# Patient Record
Sex: MALE | Race: WHITE | Employment: OTHER | URBAN - METROPOLITAN AREA
[De-identification: names, ages, dates, MRNs, and addresses within clinical notes are randomized per-mention and may not be internally consistent; named-entity substitution may affect disease eponyms.]

---

## 2017-05-25 LAB
CHOLESTEROL, TOTAL: 192 MG/DL
CHOLESTEROL/HDL RATIO: 3.9
HDLC SERPL-MCNC: 49 MG/DL (ref 35–70)
LDL CHOLESTEROL CALCULATED: 121 MG/DL (ref 0–160)
TRIGL SERPL-MCNC: 111 MG/DL
VLDLC SERPL CALC-MCNC: 22 MG/DL

## 2017-12-11 ENCOUNTER — OFFICE VISIT (OUTPATIENT)
Dept: FAMILY MEDICINE CLINIC | Age: 62
End: 2017-12-11
Payer: COMMERCIAL

## 2017-12-11 VITALS
BODY MASS INDEX: 26.25 KG/M2 | WEIGHT: 173.2 LBS | RESPIRATION RATE: 16 BRPM | SYSTOLIC BLOOD PRESSURE: 125 MMHG | DIASTOLIC BLOOD PRESSURE: 81 MMHG | HEIGHT: 68 IN | HEART RATE: 72 BPM

## 2017-12-11 DIAGNOSIS — Z12.5 PROSTATE CANCER SCREENING: ICD-10-CM

## 2017-12-11 DIAGNOSIS — E78.5 HYPERLIPIDEMIA, UNSPECIFIED HYPERLIPIDEMIA TYPE: Primary | Chronic | ICD-10-CM

## 2017-12-11 DIAGNOSIS — M19.90 OSTEOARTHRITIS, UNSPECIFIED OSTEOARTHRITIS TYPE, UNSPECIFIED SITE: Chronic | ICD-10-CM

## 2017-12-11 DIAGNOSIS — H40.9 GLAUCOMA, UNSPECIFIED GLAUCOMA TYPE, UNSPECIFIED LATERALITY: ICD-10-CM

## 2017-12-11 DIAGNOSIS — Z87.442 HISTORY OF KIDNEY STONES: ICD-10-CM

## 2017-12-11 PROCEDURE — G8419 CALC BMI OUT NRM PARAM NOF/U: HCPCS | Performed by: FAMILY MEDICINE

## 2017-12-11 PROCEDURE — 99214 OFFICE O/P EST MOD 30 MIN: CPT | Performed by: FAMILY MEDICINE

## 2017-12-11 PROCEDURE — G8427 DOCREV CUR MEDS BY ELIG CLIN: HCPCS | Performed by: FAMILY MEDICINE

## 2017-12-11 PROCEDURE — 3017F COLORECTAL CA SCREEN DOC REV: CPT | Performed by: FAMILY MEDICINE

## 2017-12-11 PROCEDURE — 1036F TOBACCO NON-USER: CPT | Performed by: FAMILY MEDICINE

## 2017-12-11 PROCEDURE — G8484 FLU IMMUNIZE NO ADMIN: HCPCS | Performed by: FAMILY MEDICINE

## 2017-12-11 RX ORDER — LATANOPROST 50 UG/ML
1 SOLUTION/ DROPS OPHTHALMIC NIGHTLY
COMMUNITY
End: 2019-01-21

## 2017-12-11 RX ORDER — ATORVASTATIN CALCIUM 20 MG/1
20 TABLET, FILM COATED ORAL DAILY
Qty: 30 TABLET | Refills: 5 | Status: SHIPPED | OUTPATIENT
Start: 2017-12-11 | End: 2018-09-24 | Stop reason: SINTOL

## 2017-12-11 ASSESSMENT — PATIENT HEALTH QUESTIONNAIRE - PHQ9
2. FEELING DOWN, DEPRESSED OR HOPELESS: 0
1. LITTLE INTEREST OR PLEASURE IN DOING THINGS: 0
SUM OF ALL RESPONSES TO PHQ QUESTIONS 1-9: 0
SUM OF ALL RESPONSES TO PHQ9 QUESTIONS 1 & 2: 0

## 2017-12-11 NOTE — PATIENT INSTRUCTIONS
or \"mini-stroke\");  · a thyroid disorder; or  · if you drink more than 2 alcoholic beverages daily. Atorvastatin can cause a condition that results in the breakdown of skeletal muscle tissue, leading to kidney failure. This condition may be more likely to occur in older adults and in people who have kidney disease or poorly controlled hypothyroidism (underactive thyroid). This medicine can harm an unborn baby or cause birth defects. Do not use if you are pregnant. Stop taking atorvastatin and tell your doctor right away if you become pregnant Use effective birth control to prevent pregnancy while you are taking this medicine. Atorvastatin may pass into breast milk and could harm a nursing baby. Do not  breast-feed while you are taking atorvastatin. Atorvastatin is not approved for use by anyone younger than 8years old. How should I take atorvastatin? Follow all directions on your prescription label. Your doctor may occasionally change your dose to make sure you get the best results. Do not use this medicine in larger or smaller amounts or for longer than recommended. Atorvastatin is usually taken once a day, with or without food. Take the medicine at the same time each day. Do not break an atorvastatin tablet before taking it. You may need to stop using atorvastatin for a short time if you have:  · uncontrolled seizures;  · an electrolyte imbalance (such as high or low potassium levels in your blood);  · severely low blood pressure;  · a severe infection or illness; or  · surgery or a medical emergency. While using atorvastatin, you may need frequent blood tests. Atorvastatin is only part of a complete treatment program that may also include diet, exercise, and weight control. Follow your doctor's instructions very closely. Store at room temperature away from moisture, heat, and light. What happens if I miss a dose? Take the missed dose as soon as you remember.  Skip the missed dose if your next dose is less than 12 hours away. Do not  take extra medicine to make up the missed dose. What happens if I overdose? Seek emergency medical attention or call the Poison Help line at 1-550.147.8761. What should I avoid while taking atorvastatin? Avoid eating foods that are high in fat or cholesterol. Atorvastatin will not be as effective in lowering your cholesterol if you do not follow a cholesterol-lowering diet plan. Avoid drinking alcohol. It can raise triglyceride levels and may increase your risk of liver damage. Grapefruit and grapefruit juice may interact with atorvastatin and lead to potentially dangerous effects. Discuss the use of grapefruit products with your doctor. What are the possible side effects of atorvastatin? Get emergency medical help if you have signs of an allergic reaction: hives; difficulty breathing; swelling of your face, lips, tongue, or throat. In rare cases, atorvastatin can cause a condition that results in the breakdown of skeletal muscle tissue, leading to kidney failure. Call your doctor right away if you have unexplained muscle pain, tenderness, or weakness especially if you also have fever, unusual tiredness, and dark colored urine. Also call your doctor at once if you have:  · kidney problems --little or no urinating, painful or difficult urination, swelling in your feet or ankles, feeling tired or short of breath;  · liver problems --nausea, upper stomach pain, itching, tired feeling, loss of appetite, dark urine, hilda-colored stools, jaundice (yellowing of the skin or eyes); or  · signs of a stroke --sudden numbness or weakness (especially on one side of the body), sudden severe headache, slurred speech, problems with vision or balance. Common side effects may include:  · muscle or joint pain;  · diarrhea; or  · upset stomach. This is not a complete list of side effects and others may occur. Call your doctor for medical advice about side effects.  You may report side to indicate that the drug or drug combination is safe, effective or appropriate for any given patient. [facility] does not assume any responsibility for any aspect of healthcare administered with the aid of information [facility] provides. The information contained herein is not intended to cover all possible uses, directions, precautions, warnings, drug interactions, allergic reactions, or adverse effects. If you have questions about the drugs you are taking, check with your doctor, nurse or pharmacist.  Copyright 4099-5906 [address]. Version: 18.03. Revision date: 6/29/2015. Care instructions adapted under license by [facility] ([facility]). If you have questions about a medical condition or this instruction, always ask your healthcare professional. [place] [address] any warranty or liability for your use of this information.

## 2017-12-11 NOTE — PROGRESS NOTES
New patient, here to establish. Previous physician retired, Dr. Betina Morales. Patient utd with colonoscopy, due in 2018 for next one. Flu vaccine in September, pt reported.    States his last tetanus was last year (2016)

## 2017-12-11 NOTE — PROGRESS NOTES
CHIEF COMPLAINT  Chief Complaint   Patient presents with   [facility] Establish Care     New patient, here to establish care. Previous Physician, Dr. Amadou Cardenas is a 57 y.o. male who presents to establish care in our office. He has glaucoma. He is under the care of ophthalmology. He uses eyedrops daily. He denies any recent changes in his vision. He has a history of high cholesterol. He is currently on, liver oil. He also takes a daily aspirin. He has a history of arthritis. He is on no medication for this this time. He indicates that he is up-to-date with colorectal cancer screening. He is due for a colonoscopy in 2018. Furthermore, he indicates that he received his flu shot this past September. ROS  All other review of systems negative, except for those noted. PAST MEDICAL HISTORY    Past Medical History:   Diagnosis Date    Asthma due to seasonal allergies     Eye pressure     Glaucoma     History of kidney stones        SURGICAL HISTORY    Past Surgical History:   Procedure Laterality Date    ANKLE SURGERY      COLONOSCOPY  12/13; due 2018    FINGER SURGERY      TONSILLECTOMY         FAMILY HISTORY    History reviewed. No pertinent family history. SOCIAL HISTORY    Social History     Social History    Marital status:      Spouse name: N/A    Number of children: N/A    Years of education: N/A     Social History Main Topics    Smoking status: Never Smoker    Smokeless tobacco: Never Used    Alcohol use 1.8 oz/week     3 Glasses of wine per week      Comment: drinks red merlot 2-3 times per week.      Drug use: No    Sexual activity: Not Asked     Other Topics Concern    None     Social History Narrative    None       MEDICATIONS  Current Outpatient Prescriptions   Medication Sig Dispense Refill    latanoprost (XALATAN) 0.005 % ophthalmic solution 1 drop nightly      aspirin 81 MG tablet Take 81 mg by mouth daily      COD LIVER OIL PO Osteoarthritis, unspecified osteoarthritis type, unspecified site     4. History of kidney stones  [address] Tian MD   5. Prostate cancer screening  PSA Screening    [address] Racquel Tian MD       ASSESSMENT & PLAN  3  58-year-old man, with the above noted issues, who appears relatively healthy. 2.  I have recommended treatment of his high cholesterol with Lipitor. 3.  Continue current treatment for glaucoma. 4.  With respect to his history of kidney stones, and need for prostate cancer screening, I have referred him to Dr. iLv Reza. 5.  Further evaluation and management will be dependent upon test results. Otherwise, follow up in our office in 6 months. DISCHARGE MEDS  Outpatient Encounter Prescriptions as of 12/11/2017   Medication Sig Dispense Refill    latanoprost (XALATAN) 0.005 % ophthalmic solution 1 drop nightly      aspirin 81 MG tablet Take 81 mg by mouth daily      COD LIVER OIL PO Take 3,000 Units by mouth 2 times daily. No facility-administered encounter medications on file as of 12/11/2017.

## 2018-01-02 ENCOUNTER — TELEPHONE (OUTPATIENT)
Dept: FAMILY MEDICINE CLINIC | Age: 63
End: 2018-01-02

## 2018-01-02 NOTE — TELEPHONE ENCOUNTER
Patient called stating he was prescribed Lipitor 20 mg tab to take once daily. Patient states he has been taking this in the evening per recommendations from our office. He states he was taking this dose for about 10 days and noticed side effects, including restlessness and tingling of arms. He states he decided to cut tablet in half and has been taking Lipitor 10 mg once every evening for the last 5 days and the side effects are absent. He states he plans on getting some lab work done mid January, but hopes it is okay to continue this dose in the meantime. Patient also wondering if PCP can advise on any OTC swimmer ear products. Please advise, thank you!

## 2018-01-18 ENCOUNTER — HOSPITAL ENCOUNTER (OUTPATIENT)
Age: 63
Discharge: HOME | End: 2018-01-18
Payer: COMMERCIAL

## 2018-01-18 DIAGNOSIS — E78.5 HYPERLIPIDEMIA, UNSPECIFIED HYPERLIPIDEMIA TYPE: Chronic | ICD-10-CM

## 2018-01-18 DIAGNOSIS — Z12.5 PROSTATE CANCER SCREENING: ICD-10-CM

## 2018-01-18 LAB
ALBUMIN SERPL-MCNC: 4.7 G/DL (ref 3.5–5.2)
ALBUMIN/GLOBULIN RATIO: 1.8 (ref 1–2.5)
ALP BLD-CCNC: 59 U/L (ref 40–129)
ALT SERPL-CCNC: 21 U/L (ref 5–41)
ANION GAP SERPL CALCULATED.3IONS-SCNC: 14 MMOL/L (ref 9–17)
AST SERPL-CCNC: 21 U/L
BILIRUB SERPL-MCNC: 0.82 MG/DL (ref 0.3–1.2)
BUN BLDV-MCNC: 15 MG/DL (ref 8–23)
BUN/CREAT BLD: NORMAL (ref 9–20)
CALCIUM SERPL-MCNC: 8.9 MG/DL (ref 8.6–10.4)
CHLORIDE BLD-SCNC: 103 MMOL/L (ref 98–107)
CHOLESTEROL/HDL RATIO: 2.2
CHOLESTEROL: 151 MG/DL
CO2: 26 MMOL/L (ref 20–31)
CREAT SERPL-MCNC: 0.92 MG/DL (ref 0.7–1.2)
GFR AFRICAN AMERICAN: >60 ML/MIN
GFR NON-AFRICAN AMERICAN: >60 ML/MIN
GFR SERPL CREATININE-BSD FRML MDRD: NORMAL ML/MIN/{1.73_M2}
GFR SERPL CREATININE-BSD FRML MDRD: NORMAL ML/MIN/{1.73_M2}
GLUCOSE BLD-MCNC: 93 MG/DL (ref 70–99)
HDLC SERPL-MCNC: 70 MG/DL
LDL CHOLESTEROL: 72 MG/DL (ref 0–130)
POTASSIUM SERPL-SCNC: 4.1 MMOL/L (ref 3.7–5.3)
PROSTATE SPECIFIC ANTIGEN: 0.72 UG/L
SODIUM BLD-SCNC: 143 MMOL/L (ref 135–144)
TOTAL PROTEIN: 7.3 G/DL (ref 6.4–8.3)
TRIGL SERPL-MCNC: 45 MG/DL
VLDLC SERPL CALC-MCNC: NORMAL MG/DL (ref 1–30)

## 2018-01-18 PROCEDURE — 36415 COLL VENOUS BLD VENIPUNCTURE: CPT

## 2018-01-18 PROCEDURE — 80061 LIPID PANEL: CPT

## 2018-01-18 PROCEDURE — G0103 PSA SCREENING: HCPCS

## 2018-01-18 PROCEDURE — 80053 COMPREHEN METABOLIC PANEL: CPT

## 2018-01-24 ENCOUNTER — HOSPITAL ENCOUNTER (OUTPATIENT)
Age: 63
Discharge: HOME | End: 2018-01-24
Payer: COMMERCIAL

## 2018-01-24 ENCOUNTER — HOSPITAL ENCOUNTER (OUTPATIENT)
Dept: GENERAL RADIOLOGY | Age: 63
Discharge: HOME | End: 2018-01-24
Payer: COMMERCIAL

## 2018-01-24 DIAGNOSIS — N20.0 URIC ACID NEPHROLITHIASIS: ICD-10-CM

## 2018-01-24 PROCEDURE — 74018 RADEX ABDOMEN 1 VIEW: CPT

## 2018-03-27 PROBLEM — L60.0 ONYCHOCRYPTOSIS: Status: ACTIVE | Noted: 2018-03-27

## 2018-05-03 ENCOUNTER — TELEPHONE (OUTPATIENT)
Dept: PRIMARY CARE CLINIC | Age: 63
End: 2018-05-03

## 2018-06-27 ENCOUNTER — OFFICE VISIT (OUTPATIENT)
Dept: PRIMARY CARE CLINIC | Age: 63
End: 2018-06-27
Payer: MEDICARE

## 2018-06-27 VITALS
BODY MASS INDEX: 26.22 KG/M2 | DIASTOLIC BLOOD PRESSURE: 68 MMHG | SYSTOLIC BLOOD PRESSURE: 108 MMHG | HEIGHT: 68 IN | HEART RATE: 53 BPM | WEIGHT: 173 LBS | OXYGEN SATURATION: 98 %

## 2018-06-27 DIAGNOSIS — M25.521 RIGHT ELBOW PAIN: Primary | ICD-10-CM

## 2018-06-27 PROCEDURE — 1123F ACP DISCUSS/DSCN MKR DOCD: CPT | Performed by: NURSE PRACTITIONER

## 2018-06-27 PROCEDURE — 4040F PNEUMOC VAC/ADMIN/RCVD: CPT | Performed by: NURSE PRACTITIONER

## 2018-06-27 PROCEDURE — G8427 DOCREV CUR MEDS BY ELIG CLIN: HCPCS | Performed by: NURSE PRACTITIONER

## 2018-06-27 PROCEDURE — 3017F COLORECTAL CA SCREEN DOC REV: CPT | Performed by: NURSE PRACTITIONER

## 2018-06-27 PROCEDURE — 1036F TOBACCO NON-USER: CPT | Performed by: NURSE PRACTITIONER

## 2018-06-27 PROCEDURE — G8419 CALC BMI OUT NRM PARAM NOF/U: HCPCS | Performed by: NURSE PRACTITIONER

## 2018-06-27 PROCEDURE — 99213 OFFICE O/P EST LOW 20 MIN: CPT | Performed by: NURSE PRACTITIONER

## 2018-06-27 RX ORDER — VITAMIN B COMPLEX
1 CAPSULE ORAL DAILY
COMMUNITY
End: 2019-11-26

## 2018-06-27 ASSESSMENT — ENCOUNTER SYMPTOMS
CHEST TIGHTNESS: 0
SHORTNESS OF BREATH: 0

## 2018-06-28 DIAGNOSIS — M25.521 ELBOW PAIN, RIGHT: Primary | ICD-10-CM

## 2018-06-29 ENCOUNTER — HOSPITAL ENCOUNTER (OUTPATIENT)
Dept: GENERAL RADIOLOGY | Facility: CLINIC | Age: 63
Discharge: HOME | End: 2018-07-01
Payer: MEDICARE

## 2018-06-29 ENCOUNTER — OFFICE VISIT (OUTPATIENT)
Dept: ORTHOPEDIC SURGERY | Age: 63
End: 2018-06-29
Payer: MEDICARE

## 2018-06-29 VITALS
DIASTOLIC BLOOD PRESSURE: 81 MMHG | BODY MASS INDEX: 25.76 KG/M2 | WEIGHT: 170 LBS | SYSTOLIC BLOOD PRESSURE: 135 MMHG | HEIGHT: 68 IN | HEART RATE: 53 BPM

## 2018-06-29 DIAGNOSIS — M25.521 ELBOW PAIN, RIGHT: ICD-10-CM

## 2018-06-29 DIAGNOSIS — M77.11 RIGHT LATERAL EPICONDYLITIS: Primary | ICD-10-CM

## 2018-06-29 PROCEDURE — 73080 X-RAY EXAM OF ELBOW: CPT

## 2018-06-29 PROCEDURE — 99203 OFFICE O/P NEW LOW 30 MIN: CPT | Performed by: FAMILY MEDICINE

## 2018-07-02 ENCOUNTER — HOSPITAL ENCOUNTER (OUTPATIENT)
Dept: PHYSICAL THERAPY | Facility: CLINIC | Age: 63
Setting detail: THERAPIES SERIES
Discharge: HOME | End: 2018-07-02
Payer: MEDICARE

## 2018-07-02 PROCEDURE — G8984 CARRY CURRENT STATUS: HCPCS

## 2018-07-02 PROCEDURE — 97161 PT EVAL LOW COMPLEX 20 MIN: CPT

## 2018-07-02 PROCEDURE — G8985 CARRY GOAL STATUS: HCPCS

## 2018-07-02 PROCEDURE — 97140 MANUAL THERAPY 1/> REGIONS: CPT

## 2018-07-02 NOTE — CONSULTS
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    Pain:  [] Yes  [] No Location: R forearm Pain Rating: (0-10 scale) 2-3/10 (rest); 6/10 (aggravated)- nagging/weakness  3-4/10, dull shooting, aching, numb  Pain altered Tx:  [] Yes  [] No  Action:  Symptoms:  [x] Improving- laid off the tennis and stretching; sleeping with a wrist splint that is keeping him neutral [] Worsening [] Same  Better:  [] AM    [] PM    [] Sit    [] Rise/Sit    []Stand    [] Walk    [] Lying    [x] Other: rest, medication, wrist splint  Worse: [] AM    [] PM    [] Sit    [] Rise/Sit    []Stand    [] Walk    [] Lying    [] Bend                             [] Valsalva    [x] Other: lifting, gripping  Sleep: [x] OK    [] Disturbed    Goals: reduce pain/achiness; resume playing tennis    Objective:  OBSERVATION No Deficit Deficit Not Tested Comments   Forward Head [] [x] []    Rounded Shoulders [] [x] []    Scap Height/Position [x] [] []    Winging [x] [] []    SH Rhythm [] [x] [] Mild alteration in scapular mechanics   INSPECTION/PALPATION    LUE: TTP over proximal extensor muscle belly    RUE: TTP proximal extensor muscle belly (supinator muscle belly), lateral epicondyle, mild tenderness along distal biceps muscle belly    Tenderness in B pecs with notable tightness- especially pec minor  Tenderness over R UT   NEUROLOGICAL       Cervical ROM/Quadrant [] [] [x]    Reflexes [] [] [x]    Compression/Distraction [] [] [x]    Sensation [x] [] []           ROM  °A/P END FEEL STRENGTH TESTS (+/-) Left Right    Left Right  Left Right Mills Test  pos   Sit Shld Flex  [facility]         [facility]         Sit Shld IR  WFL WFL         Sit Shlf ER WFL WFL         Rhomboids    5 5      Lats    5 5      Mid Trap    5 4+      Lower Trap    4+ 4+      Elbow Flex. (0-150) Full Full  5 5      Elbow Ext. (5/10-0) Full Full  5 4+      Pronation (0-90) 90° 95°  5 5 (no increase in 6/10 with aggravated activities  [x] ? ROM: RUE: pain with passive wrist flexion, decreased active/passive supination  [x] ? Strength: decreased  strength due to pain; decreased parascapular strength  [x] ? Function: UEFS: 69/80, 86.25% max function  [x] Other: Altered scapular mechanics and decreased flexibility of upper trap and pectoralis musculature       Longer term goals: MEET IN 8 VISITS    Pain: Pt will report less than or equal to 0-1/10 R elbow pain at rest and less than or equal to 2-3/10 R elbow pain with repetitive gripping, lifting a cup of coffee, typing, swinging, and putting activities in order to progress to prior level of activity. Ongoing   ROM: Pt will demonstrate improvement in R supination AROM to greater than or equal to 90° and R wrist flexion to greater than or equal to 70° with reports of 0/10 increase in R elbow pain in order to improve tolerance to gripping, lifting, and completing ADLs. Ongoing   Strength: Pt will be able to demonstrate improvement in  strength on the RUE by 10% with 0/10 R elbow pain in order to improve tolerance to gripping and lifting objects. Ongoing   Strength: Pt will be able to demonstrate 5/5 B scapular strength to promote stable base and dynamic stability when participating in recreational activities. Ongoing    Outcome Score: Pt will score greater than or equal to 96% on the UEFS in order to demonstrate improved function Ongoing   Demonstrates knowledge of fall prevention Not needed   Home Exercise Program: Pt will demonstrate independence with HEP.  Ongoing                     Patient goals: reduce pain/achiness, resume playing tennis    G-Codes: Initial  Functional Limitation: Carrying, moving, and handling objects  Functional Assessment Used: UEFS: 13.75% impairment  Current Status Modifier: CI  Goal Status Modifier: [facility]                       Rehab Potential:  [x] Good  [] Fair  [] Poor   Suggested Professional Referral:  [] No  [x] Yes: Golf

## 2018-07-05 ENCOUNTER — HOSPITAL ENCOUNTER (OUTPATIENT)
Dept: PHYSICAL THERAPY | Facility: CLINIC | Age: 63
Setting detail: THERAPIES SERIES
Discharge: HOME | End: 2018-07-05
Payer: MEDICARE

## 2018-07-05 PROCEDURE — 97110 THERAPEUTIC EXERCISES: CPT

## 2018-07-05 PROCEDURE — 97140 MANUAL THERAPY 1/> REGIONS: CPT

## 2018-07-05 NOTE — FLOWSHEET NOTE
of the forearm; upper trap stretch; parascapular and rotator cuff strengthening     Lateral glides and poster-anterior glides to radial head concurrent gripping while monitoring pain 6-10x             Treatment Charges: Mins Units   []  Modalities     [x]  Ther Exercise 40 2   [x]  Manual Therapy 10 1   []  Ther Activities     []  Aquatics     []  Vasocompression     []  Other     Total Treatment time 50 3       Assessment: [x] Progressing toward goals. Good tolerance to manual and exercise with 0/10 pain throughout session. Gave frequent cuing and education for proper form and technique with exercises and stretches. [] No change. [] Other:    Longer term goals: MEET IN 8 VISITS     Pain: Pt will report less than or equal to 0-1/10 R elbow pain at rest and less than or equal to 2-3/10 R elbow pain with repetitive gripping, lifting a cup of coffee, typing, swinging, and putting activities in order to progress to prior level of activity. Ongoing   ROM: Pt will demonstrate improvement in R supination AROM to greater than or equal to 90° and R wrist flexion to greater than or equal to 70° with reports of 0/10 increase in R elbow pain in order to improve tolerance to gripping, lifting, and completing ADLs. Ongoing   Strength: Pt will be able to demonstrate improvement in  strength on the RUE by 10% with 0/10 R elbow pain in order to improve tolerance to gripping and lifting objects. Ongoing   Strength: Pt will be able to demonstrate 5/5 B scapular strength to promote stable base and dynamic stability when participating in recreational activities. Ongoing    Outcome Score: Pt will score greater than or equal to 96% on the UEFS in order to demonstrate improved function Ongoing   Demonstrates knowledge of fall prevention Not needed   Home Exercise Program: Pt will demonstrate independence with HEP.  Ongoing                      Patient goals: reduce pain/achiness, resume playing tennis     G-Codes:

## 2018-07-05 NOTE — PLAN OF CARE
[] Anthony Clark        Outpatient Physical                Therapy       [address]       Phone: [phone]       Fax: [phone] [] [facility]       Promotion at [address] [facility]       Phone: [phone]       Fax: [phone] [] Ian Ward [facility]      for Health Promotion    [address]     Phone: [phone]     Fax:  [phone]     Physical Therapy Plan of Care    Date:  2018  Patient: Lucien Herring  : 1955  MRN: [id number]  Physician: Dr. Aracelis Abel: Agatha Callahan Diagnosis: M77.11 (ICD-10-CM) - Right lateral epicondylitis                    Rehab Codes: M25.521, M79.1, R29.3, M79.631, M62.531, M62.541  Onset Date: 2018                     Next 's appt: as needed     Subjective: \"Bill\" or \"Compa\"  CC: R elbow pain  HPI: (onset date): Pt is a 68-year-old male with c/o R lateral elbow pain that has been present for about 10 days. Pt reports he is an avid  and plays 3 days per week for about 2-2.5 hours and golfs about 2-3x/week. Pt notes about 10 days ago, he started to feel some discomfort in the lateral aspect of his R elbow. Pt notes it is more painful when completing a backhand swing and putting when golfing. Pt describes the pain as an achiness/throbbing but does not report any sharp pains. Pt notes the pain is on the dorsal aspect of the R arm and ore lateral vs medial. Pt denies any current numbness and tingling, however, does state sometimes it is on the verge. Pt notes the he does have a hx of neck and R shoulder pain that caused numbness/tingling down the RUE, however, pt reports this current pain seems specific to the elbow. Pt notes he has increased pain with playing tennis, golfing, lifting up a coffee cup. Pt notes when he attempts to lift a coffee cup, he feels weak in the RUE.  Pt reports he also has a part time job and uses the to 2-3/10 R elbow pain with repetitive gripping, lifting a cup of coffee, typing, swinging, and putting activities in order to progress to prior level of activity. Ongoing   ROM: Pt will demonstrate improvement in R supination AROM to greater than or equal to 90° and R wrist flexion to greater than or equal to 70° with reports of 0/10 increase in R elbow pain in order to improve tolerance to gripping, lifting, and completing ADLs. Ongoing   Strength: Pt will be able to demonstrate improvement in  strength on the RUE by 10% with 0/10 R elbow pain in order to improve tolerance to gripping and lifting objects. Ongoing   Strength: Pt will be able to demonstrate 5/5 B scapular strength to promote stable base and dynamic stability when participating in recreational activities. Ongoing    Outcome Score: Pt will score greater than or equal to 96% on the UEFS in order to demonstrate improved function Ongoing   Demonstrates knowledge of fall prevention Not needed   Home Exercise Program: Pt will demonstrate independence with HEP.  Ongoing                      Patient goals: reduce pain/achiness, resume playing tennis                   Treatment Plan:  [x] Therapeutic Exercise             [] Modalities:  [] Therapeutic Activity               [] Ultrasound                  [] Electrical Stimulation  [] Gait Training                          [] Massage        [] Lumbar/Cervical Traction  [x] Neuromuscular Re-education            [x] Cold/hotpack  [] Iontophoresis: 4 mg/mL  [x] Instruction in HEP                                                                       Dexamethasone Sodium  [x] Manual Therapy                                                                          Phosphate 40-80 mAmin  [] Aquatic Therapy                    [x] Vasocompression/                   [] Other:                                                                   Game Ready            []  Medication allergies reviewed for use of Dexamethasone Sodium Phosphate 4mg/ml                with iontophoresis treatments. Pt is not allergic. Frequency:  2 x/week for 8 visits      Electronically signed by: Kindra Adams PT        Physician Signature:________________________________Date:__________________  By signing above or cosigning this note, I have reviewed this plan of care and certify a need for medically necessary rehabilitation services.      *PLEASE SIGN ABOVE AND FAX BACK ALL PAGES*

## 2018-07-09 ENCOUNTER — HOSPITAL ENCOUNTER (OUTPATIENT)
Dept: PHYSICAL THERAPY | Facility: CLINIC | Age: 63
Setting detail: THERAPIES SERIES
Discharge: HOME | End: 2018-07-09
Payer: MEDICARE

## 2018-07-09 PROCEDURE — 97140 MANUAL THERAPY 1/> REGIONS: CPT

## 2018-07-09 PROCEDURE — 97110 THERAPEUTIC EXERCISES: CPT

## 2018-07-09 NOTE — FLOWSHEET NOTE
[] Jacy Dewey       Outpatient Physical        Therapy       [address]       Phone: [phone]       Fax: [phone] [x] [facility] Promotion at [address] [facility]       Phone: [phone]       Fax: [phone] [] [facility] Promotion  [address] [facility]   Phone: [phone]   Fax:  [phone]     Physical Therapy Daily Treatment Note    Date:  2018  Patient Name:  Declan Moore    :  1955  MRN: [id number]  Physician: Dr. Gomez Rodrigo: Anjali Arroyo Diagnosis: M77.11 (ICD-10-CM) - Right lateral epicondylitis                       Rehab Codes: M25.521, M79.1, R29.3, M79.631, M62.531, M62.541  Onset Date: 2018                     Next 's appt: as needed  Visit# / total visits: 3/8  Cancels/No Shows: 0    Subjective:    Pain:  [x] Yes  [] No  Location: R elbow  Pain Rating: (0-10 scale) 0/10  Pain altered Tx:  [x] No  [] Yes  Action:     Comments: pt reports no changes. Still becoming irritated with swing the tennis racket or golf club. Pt denies pain upon entering and comments that his biggest issue seems to be his strength. Pt states he was unable to lift a jug of iced tea.      Objective:  Modalities:   Hawkgrips to extensor aspect of the R forearm and lateral epicondyle (boomerrang) 10'  Ice massage x5 min   Precautions:  Exercises:  Exercise Reps/ Time Weight/ Level Comments   Wrist flexor stretch 3x30\"       Supination stretch 5x10\"       passive extension--> eccentric lowering 10x  3\" hold     pec stretch- doorway  3x 30\"     Upper trap stretch 3x 30\"           Prone I, T, row, extension, scaption 6x ea 6\"                      Other:     Specific Instructions for next treatment: monitor symptoms of exercises- if pain with eccentric lowering- revert back to pain-free isometrics with (30-60\" holds) - wrist in 20-30° extension; manual work to upper trap, pecs, extensor aspect of the forearm; upper trap stretch; parascapular and rotator cuff strengthening     Lateral glides and poster-anterior glides to radial head concurrent gripping while monitoring pain 6-10x             Treatment Charges: Mins Units   []  Modalities     [x]  Ther Exercise 40 2   [x]  Manual Therapy 10 1   []  Ther Activities     []  Aquatics     []  Vasocompression     []  Other     Total Treatment time 50 3       Assessment: [x] Progressing toward goals. Fair tolerance to exercises, pt reports feeling some soreness during lateral glides and tenderness with hawk . Pt does not report increase pain after PT, but requests ice massage stating it helps to \"calm down\" his symptoms. [] No change. [] Other:    Longer term goals: MEET IN 8 VISITS     Pain: Pt will report less than or equal to 0-1/10 R elbow pain at rest and less than or equal to 2-3/10 R elbow pain with repetitive gripping, lifting a cup of coffee, typing, swinging, and putting activities in order to progress to prior level of activity. Ongoing   ROM: Pt will demonstrate improvement in R supination AROM to greater than or equal to 90° and R wrist flexion to greater than or equal to 70° with reports of 0/10 increase in R elbow pain in order to improve tolerance to gripping, lifting, and completing ADLs. Ongoing   Strength: Pt will be able to demonstrate improvement in  strength on the RUE by 10% with 0/10 R elbow pain in order to improve tolerance to gripping and lifting objects. Ongoing   Strength: Pt will be able to demonstrate 5/5 B scapular strength to promote stable base and dynamic stability when participating in recreational activities. Ongoing    Outcome Score: Pt will score greater than or equal to 96% on the UEFS in order to demonstrate improved function Ongoing   Demonstrates knowledge of fall prevention Not needed   Home Exercise Program: Pt will demonstrate independence with HEP.  Ongoing

## 2018-07-12 ENCOUNTER — APPOINTMENT (OUTPATIENT)
Dept: PHYSICAL THERAPY | Facility: CLINIC | Age: 63
End: 2018-07-12
Payer: MEDICARE

## 2018-07-13 ENCOUNTER — HOSPITAL ENCOUNTER (OUTPATIENT)
Dept: PHYSICAL THERAPY | Facility: CLINIC | Age: 63
Setting detail: THERAPIES SERIES
Discharge: HOME | End: 2018-07-13
Payer: MEDICARE

## 2018-07-13 PROCEDURE — 97110 THERAPEUTIC EXERCISES: CPT

## 2018-07-13 PROCEDURE — 97140 MANUAL THERAPY 1/> REGIONS: CPT

## 2018-07-16 ENCOUNTER — HOSPITAL ENCOUNTER (OUTPATIENT)
Dept: PHYSICAL THERAPY | Facility: CLINIC | Age: 63
Setting detail: THERAPIES SERIES
Discharge: HOME | End: 2018-07-16
Payer: MEDICARE

## 2018-07-16 PROCEDURE — 97110 THERAPEUTIC EXERCISES: CPT

## 2018-07-16 PROCEDURE — 97140 MANUAL THERAPY 1/> REGIONS: CPT

## 2018-07-16 NOTE — FLOWSHEET NOTE
ROM: Pt will demonstrate improvement in R supination AROM to greater than or equal to 90° and R wrist flexion to greater than or equal to 70° with reports of 0/10 increase in R elbow pain in order to improve tolerance to gripping, lifting, and completing ADLs. Ongoing   Strength: Pt will be able to demonstrate improvement in  strength on the RUE by 10% with 0/10 R elbow pain in order to improve tolerance to gripping and lifting objects. Ongoing   Strength: Pt will be able to demonstrate 5/5 B scapular strength to promote stable base and dynamic stability when participating in recreational activities. Ongoing    Outcome Score: Pt will score greater than or equal to 96% on the UEFS in order to demonstrate improved function Ongoing   Demonstrates knowledge of fall prevention Not needed   Home Exercise Program: Pt will demonstrate independence with HEP. Ongoing                      Patient goals: reduce pain/achiness, resume playing tennis     G-Codes: Initial  Functional Limitation: Carrying, moving, and handling objects  Functional Assessment Used: UEFS: 13.75% impairment  Current Status Modifier: CI  Goal Status Modifier: [address]      Pt. Education:  [] Yes  [x] No  [] Reviewed Prior HEP/Ed  Method of Education: [] Verbal  [] Demo  [] Written  Comprehension of Education:  [] Verbalizes understanding. [] Demonstrates understanding. [] Needs review. [] Demonstrates/verbalizes HEP/Ed previously given. Plan: [x] Continue per plan of care.    [] Other:      Time In: 1:45pm              Time Out: 2:45pm    Electronically signed by:  Jeri Baeza PTA

## 2018-07-18 ENCOUNTER — HOSPITAL ENCOUNTER (OUTPATIENT)
Dept: PHYSICAL THERAPY | Facility: CLINIC | Age: 63
Setting detail: THERAPIES SERIES
Discharge: HOME | End: 2018-07-18
Payer: MEDICARE

## 2018-07-18 PROCEDURE — 97110 THERAPEUTIC EXERCISES: CPT

## 2018-07-18 PROCEDURE — 97140 MANUAL THERAPY 1/> REGIONS: CPT

## 2018-07-18 NOTE — FLOWSHEET NOTE
[] Julisa Hebert       Outpatient Physical        Therapy       [address]       Phone: [phone]       Fax: [phone] [x] [facility] at [address]       Phone: [phone]       Fax: [phone] [] Maurizio. [address]   Phone: [phone]   Fax:  [phone]     Physical Therapy Daily Treatment Note    Date:  2018  Patient Name:  Uriel Chapin    :  1955  MRN: [id number]  Physician: Dr. Simon Grams: Lee Gaspar Diagnosis: M77.11 (ICD-10-CM) - Right lateral epicondylitis                       Rehab Codes: M25.521, M79.1, R29.3, M79.631, M62.531, M62.541  Onset Date: 2018                     Next 's appt: as needed  Visit# / total visits:   Cancels/No Shows: 0    Subjective:    Pain:  [x] Yes  [] No  Location: R elbow  Pain Rating: (0-10 scale) 1-2/10  Pain altered Tx:  [x] No  [] Yes  Action:     Comments: Pt stated he played tennis this morning and reports certain shots increase his pain.    Objective:   Modalities:   Hawkgrips to extensor aspect of the R forearm and lateral epicondyle (boomerrang) 10'  Ice massage x5 min    Precautions:  Exercises:  Exercise Reps/ Time Weight/ Level Comments   Wrist extensor stretch 3x30\"   Wrist into flexion    Supination stretch 5x10\"       Passive extension--> eccentric lowering 10x  3\" hold Seated against gravity with arm on UE riser on 18     Pec stretch- doorway  3x 30\"     Upper trap stretch 3x 30\"           Prone I, T, row, extension, scaption 10x ea 6\" Increased reps 18   tband Bilateral ER 15x  red Added  Verbal cues needed   Strenghtening      Wrist flexion  15x 2# Added on 18 Increased weight    Wrist extension  15x 2# Added on 18 Increased weight    Supination/Pronation   15x 2 weights distally  Added on 18   Towel twists

## 2018-07-23 ENCOUNTER — HOSPITAL ENCOUNTER (OUTPATIENT)
Dept: PHYSICAL THERAPY | Facility: CLINIC | Age: 63
Setting detail: THERAPIES SERIES
Discharge: HOME | End: 2018-07-23
Payer: MEDICARE

## 2018-07-23 PROCEDURE — 97110 THERAPEUTIC EXERCISES: CPT

## 2018-07-23 PROCEDURE — 97140 MANUAL THERAPY 1/> REGIONS: CPT

## 2018-07-23 NOTE — FLOWSHEET NOTE
[] Migdalia Monday       Outpatient Physical        Therapy       [address]       Phone: [phone]       Fax: [phone] [x] [facility] at [address] [facility]       Phone: [phone]       Fax: [phone] [] [facility]  [address] [facility]   Phone: [phone]   Fax:  [phone]     Physical Therapy Daily Treatment Note    Date:  2018  Patient Name:  Anamika Juares    :  1955  MRN: [id number]  Physician: Dr. Holly Kunz: [facility]  Medical Diagnosis: M77.11 (ICD-10-CM) - Right lateral epicondylitis                       Rehab Codes: M25.521, M79.1, R29.3, M79.631, M62.531, M62.541  Onset Date: 2018                     Next 's appt: as needed  Visit# / total visits:   Cancels/No Shows: 0    Subjective:    Pain:  [x] Yes  [] No  Location: R elbow  Pain Rating: (0-10 scale) 1/10  Pain altered Tx:  [x] No  [] Yes  Action:     Comments: Pt reports he continues to play tennis and notes less frequent \"zingers\" when hitting the ball. Pt also notes improvements in overall episodes of weakness. Pt notes he can  coffee cup, iced tea, and hand shake with decreased discomfort.    Objective:   Modalities:   Hawkgrips to extensor aspect of the R forearm and lateral epicondyle (boomerrang) 4'- decreased tenderness noted- decreased following minimal time with manual followed by stretching  Ice massage x5 min    Precautions:  Exercises: bolded completed   Exercise Reps/ Time Weight/ Level Comments   UBE 4' L1 Added    Wrist extensor stretch 3x30\"  2x30\"   Wrist into flexion    Pec stretch- doorway  3x 30\"     Upper trap stretch 2x 30\"           Prone T, row, extension, scaption 10x ea 1# Added weight    tband Bilateral ER 2x10  red Added  Verbal cues needed   D2 PNF flexion 2x10 red Added    D1 extension- simulated backswing 2x10 red Added 7/23   Strenghtening      Wrist flexion  15x 2# Added on 7/16/18 Increased weight 7/18   Wrist extension  15x 2# Added on 7/16/18 Increased weight 7/18    Emphasis on slow lowering   Supination/Pronation   15x 2 weights distally  Added on 7/16/18  Feels some discomfort in the ulnar aspect of the R wrist with pronation   Towel twists  5x10\"  Added on 7/16/18   Twisting motion for 10\"   Hand maze 4x   Added 7/18/18   Other:    7/23/18-  strength  No pain- 67# (66.6#)  65  65  70     Specific Instructions for next treatment: monitor symptoms of exercises- if pain with eccentric lowering- revert back to pain-free isometrics with (30-60\" holds) - wrist in 20-30° extension; manual work to upper trap, pecs, extensor aspect of the forearm; upper trap stretch; parascapular and rotator cuff strengthening     Lateral glides and poster-anterior glides to radial head concurrent gripping while monitoring pain 6-10x-not today             Treatment Charges: Mins Units   []  Modalities     [x]  Ther Exercise 35 2   [x]  Manual Therapy 4 1   []  Ther Activities     []  Aquatics     []  Vasocompression     []  Other     Total Treatment time 39 3   Estimated Medicare Cost (as of 7/23/18): $589.27    Assessment: [x] Progressing toward goals. Pt denies an increase in R forearm pain at the end of the session this date. Pt was able to complete 3 consecutive max  strength activities without reports of pain in the extensor compartment of the R forearm. Pt with continued tenderness along the ECRB/ECRL which is improved with manual therapy. Pt with good tolerance to all isotonic strengthening activities in the R wrist with notable decrease eccentric strength of the R wrist extensors. Pt with ability to progress parascapular strengthening exercises without adverse events. Pt advised to decrease frequency of putting practice to allow for decreased repetitive movement.  Pt with be re-assessed at the next visit and

## 2018-07-26 ENCOUNTER — HOSPITAL ENCOUNTER (OUTPATIENT)
Dept: PHYSICAL THERAPY | Facility: CLINIC | Age: 63
Setting detail: THERAPIES SERIES
Discharge: HOME | End: 2018-07-26
Payer: MEDICARE

## 2018-07-26 PROCEDURE — G8985 CARRY GOAL STATUS: HCPCS

## 2018-07-26 PROCEDURE — 97110 THERAPEUTIC EXERCISES: CPT

## 2018-07-26 PROCEDURE — G8986 CARRY D/C STATUS: HCPCS

## 2018-07-26 NOTE — FLOWSHEET NOTE
Added weight 7/23   tband Bilateral ER 2x10  red Added 7/18 Verbal cues needed   D2 PNF flexion 2x10 red Added 7/23   D1 extension- simulated backswing 2x10 red Added 7/23   Strenghtening      Wrist flexion  15x 2# Added on 7/16/18 Increased weight 7/18   Wrist extension  15x 2# Added on 7/16/18 Increased weight 7/18    Emphasis on slow lowering   Supination/Pronation   15x 2 weights distally  Added on 7/16/18  Feels some discomfort in the ulnar aspect of the R wrist with pronation   Towel twists  5x10\"  Added on 7/16/18   Twisting motion for 10\"   Hand maze 4x   Added 7/18/18   Other:      7/26/18  PRTEE Score 7/5/18 7/26/18   Total Score 22/100 15.5/100    strength: 84# (mild increase in pain 1-2/10)  Parascapular strength:  Rhomboids: 5/5 B  Lats: 5/5, B  Middle trap: 5/5  Lower trap: 5/5    R wrist AROM- pain-free  Flexion: 75°  Extension: 73°  RD, UD, supination, pronation: [facility]        Treatment Charges: Mins Units   []  Modalities     [x]  Ther Exercise 40 3   [x]  Manual Therapy 3 0   []  Ther Activities     []  Aquatics     []  Vasocompression     []  Other     Total Treatment time 39 3   Estimated Medicare Cost (as of 7/26/18): $857.33    Assessment: [x] Progressing toward goals. Pt denies an increase in R forearm pain at the end of the session. pt with improvements in R forearm AROM, strength, and tolerance to gripping activities without an increase in R forearm pain. pt reports he would like to continue addressing his symptoms with the HEP provided. Pt was advised to decrease the frequency of tennis secondary to increasing to 3 times per week resulted in aggravation of symptoms. Pt verbalizes understanding and will be seen for an additional visit for golf assessment. [] No change.      [] Other:    Longer term goals: MEET IN 8 VISITS     Pain: Pt will report less than or equal to 0-1/10 R elbow pain at rest and less than or equal to 2-3/10 R elbow pain with repetitive gripping, lifting a cup of coffee, typing, swinging, and putting activities in order to progress to prior level of activity. MET 7/26/18   ROM: Pt will demonstrate improvement in R supination AROM to greater than or equal to 90° and R wrist flexion to greater than or equal to 70° with reports of 0/10 increase in R elbow pain in order to improve tolerance to gripping, lifting, and completing ADLs. Partially MET 7/26/18   Strength: Pt will be able to demonstrate improvement in  strength on the RUE by 10% with 0/10 R elbow pain in order to improve tolerance to gripping and lifting objects. MET 7/26/18   Strength: Pt will be able to demonstrate 5/5 B scapular strength to promote stable base and dynamic stability when participating in recreational activities. Partially MET 7/26/18   Outcome Score: Pt will score greater than or equal to 96% on the UEFS in order to demonstrate improved function NOT MET 7/26/18   Demonstrates knowledge of fall prevention Not needed   Home Exercise Program: Pt will demonstrate independence with HEP. MET 7/26/18                     Patient goals: reduce pain/achiness, resume playing tennis     G-Codes: Visit 8  Functional Limitation: Carrying, moving, and handling objects  Functional Assessment Used: UEFS: 16.25% impairment  Current Status Modifier: CI  Goal Status Modifier: [address] [place] [address]    G-Codes: Initial  Functional Limitation: Carrying, moving, and handling objects  Functional Assessment Used: UEFS: 13.75% impairment  Current Status Modifier: CI  Goal Status Modifier: [address] [place] [address]      Pt. Education:  [x] Yes  [] No  [] Reviewed Prior HEP/Ed  Method of Education: [x] Verbal  [] Demo  [x] Written  Comprehension of Education:  [x] Verbalizes understanding. [] Demonstrates understanding. [] Needs review. [x] Demonstrates/verbalizes HEP/Ed previously given. Plan: [] Continue per plan of care.    [x] Other: Discharge formal therapy      Time In: 5:40 pm              Time Out: 6:30pm    Electronically signed by:  Maggie Anderson

## 2018-07-30 NOTE — PROGRESS NOTES
increase in R forearm pain at the end of the session. pt with improvements in R forearm AROM, strength, and tolerance to gripping activities without an increase in R forearm pain. pt reports he would like to continue addressing his symptoms with the HEP provided. Pt was advised to decrease the frequency of tennis secondary to increasing to 3 times per week resulted in aggravation of symptoms. Pt verbalizes understanding and will be seen for an additional visit for golf assessment. [] No change. [] Other:     Longer term goals: MEET IN 8 VISITS     Pain: Pt will report less than or equal to 0-1/10 R elbow pain at rest and less than or equal to 2-3/10 R elbow pain with repetitive gripping, lifting a cup of coffee, typing, swinging, and putting activities in order to progress to prior level of activity. MET 7/26/18   ROM: Pt will demonstrate improvement in R supination AROM to greater than or equal to 90° and R wrist flexion to greater than or equal to 70° with reports of 0/10 increase in R elbow pain in order to improve tolerance to gripping, lifting, and completing ADLs.   Partially MET 7/26/18   Strength: Pt will be able to demonstrate improvement in  strength on the RUE by 10% with 0/10 R elbow pain in order to improve tolerance to gripping and lifting objects.  MET 7/26/18   Strength: Pt will be able to demonstrate 5/5 B scapular strength to promote stable base and dynamic stability when participating in recreational activities. Partially MET 7/26/18   Outcome Score: Pt will score greater than or equal to 96% on the UEFS in order to demonstrate improved function NOT MET 7/26/18   Demonstrates knowledge of fall prevention Not needed   Home Exercise Program: Pt will demonstrate independence with HEP.  MET 7/26/18         Patient goals: reduce pain/achiness, resume playing tennis     G-Codes: Visit 8  Functional Limitation: Carrying, moving, and handling objects  Functional Assessment Used: UEFS: 16.25% impairment  Current Status Modifier: CI  Goal Status Modifier: [address]      Treatment to Date:  [x] Therapeutic Exercise    [] Modalities:  [] Therapeutic Activity    [] Ultrasound  [] Electrical Stimulation  [] Gait Training     [] Massage       [] Lumbar/Cervical Traction  [] Neuromuscular Re-education [x] Cold/hotpack [] Iontophoresis: 4 mg/mL  [x] Instruction in Home Exercise Program                     Dexamethasone Sodium  [x] Manual Therapy             Phosphate 40-80 mAmin  [] Aquatic Therapy                   [] Vasocompression/   [] Other:  [] Dry Needling                                           Game Ready        Patient Status:     [x] Continue per initial plan of care. [] Additional visits necessary. [x] Other:Additional visit for Golf assessment     Requested Frequency/Duration: 1-2 times per week for 1-2 treatments        Electronically signed by Sonu Wills PT on 7/30/2018 at 2:24 PM      If you have any questions or concerns, please don't hesitate to call. Thank you for your referral.    Physician Signature:________________________________Date:__________________  By signing above or cosigning this note, I have reviewed this plan of care and certify a need for medically necessary rehabilitation services.      *PLEASE SIGN ABOVE AND FAX BACK ALL PAGES*

## 2018-08-01 ENCOUNTER — HOSPITAL ENCOUNTER (OUTPATIENT)
Dept: PHYSICAL THERAPY | Facility: CLINIC | Age: 63
Setting detail: THERAPIES SERIES
Discharge: HOME | End: 2018-08-01
Payer: MEDICARE

## 2018-08-01 PROCEDURE — G8985 CARRY GOAL STATUS: HCPCS

## 2018-08-01 PROCEDURE — G8986 CARRY D/C STATUS: HCPCS

## 2018-08-01 PROCEDURE — 97110 THERAPEUTIC EXERCISES: CPT

## 2018-08-01 NOTE — FLOWSHEET NOTE
[] [facility]       Outpatient Physical        Therapy       [address]       Phone: [phone]       Fax: [phone] [x] [facility] at [address]       Phone: [phone]       Fax: [phone] [] [facility].  [address] [place] [address] Promotion  [address] [place] [address]   Phone: [phone]   Fax:  [phone]     Physical Therapy Daily Treatment Note    Date:  2018  Patient Name:  Dinah Rushing    :  1955  MRN: [id number]  Physician: Dr. Olya Mars: Kirk Shafer  Medical Diagnosis: M77.11 (ICD-10-CM) - Right lateral epicondylitis                       Rehab Codes: M25.521, M79.1, R29.3, M79.631, M62.531, M62.541  Onset Date: 2018                     Next 's appt: as needed  Visit# / total visits: 9    Cancels/No Shows: 0    Subjective:    Pain:  [x] Yes  [] No  Location: R elbow  Pain Rating: (0-10 scale) 0/10  Pain altered Tx:  [x] No  [] Yes  Action:     Comments: Pt has completed 8 PT visits for R lateral epicondylitis; he returns today for golf specific assessment and for therapist to provide any additional recommendations that would assist in Mr Villalpando's rehab     Objective:    Modalities:     Precautions:  Exercises: Held 18   Exercise Reps/ Time Weight/ Level Comments   UBE 4' L1 Added    Wrist extensor stretch 3x30\"  2x30\"   Wrist into flexion    Pec stretch- doorway  3x 30\"     Upper trap stretch 2x 30\"           Prone T, row, extension, scaption 10x ea 1# Added weight    tband Bilateral ER 2x10  red Added  Verbal cues needed   D2 PNF flexion 2x10 red Added    D1 extension- simulated backswing 2x10 red Added    Strenghtening      Wrist flexion  15x 2# Added on 18 Increased weight    Wrist extension  15x 2# Added on 18 Increased weight     Emphasis on slow lowering   Supination/Pronation   15x 2 weights distally  Added on 7/16/18  Feels some discomfort in the ulnar aspect of the R wrist with pronation   Towel twists  5x10\"  Added on 7/16/18   Twisting motion for 10\"   Hand maze 4x   Added 7/18/18   Other:      7/26/18  PRTEE Score 7/5/18 7/26/18   Total Score 22/100 15.5/100    strength: 84# (mild increase in pain 1-2/10)  Parascapular strength:  Rhomboids: 5/5 B  Lats: 5/5, B  Middle trap: 5/5  Lower trap: 5/5    R wrist AROM- pain-free  Flexion: 75°  Extension: 73°  RD, UD, supination, pronation: [facility]        Treatment Charges: Mins Units   []  Modalities     [x]  Ther Exercise 35 2   []  Manual Therapy     []  Ther Activities     []  Aquatics     []  Vasocompression     []  Other     Total Treatment time 35 2   Estimated Medicare Cost (as of 8/1/18/18): $717.22    Assessment: [] Progressing toward goals. [] No change.      [x] Other:TPI assessment 8/1/18:  Starting pelvic tilt    L Pelvic Rotation  R  L  Torso Rotation R   Overhead deep squat   Neutral* X   Good*    X Good*  X   Bar overhead deep squat   S-posture   X limited X   limited    Arms down full deep squat   C-posture     Holding shoulders     Holding hips   X Arms down limited deep squat   Amount of motion     Improves     X improves X   Half kneeling ankle test   Normal*   X Doesn't improve    Doesn't improve    X Good DF bilaterally   Able to flatten X   coordination           R ankle DF limited   Both limited    Good rotary mvt*           L ankle DF limited   Quality of mvt    X More lateral mvt         Both Limited   Smooth*                 Weight shift   Shake n bake              X No weight shift   Did not test                 Shift to R                     Shift to L                90/90     *See additional test results in soft chart*   L      standing R         Greater than spine angle         X Equal to spine angle X         Less than spine angle           Golf posture           Equal to stand        X Less than stand  X         Greater Reviewed Prior HEP/Ed  Method of Education: [x] Verbal  [] Demo  [x] Written  Comprehension of Education:  [x] Verbalizes understanding. [] Demonstrates understanding. [] Needs review. [x] Demonstrates/verbalizes HEP/Ed previously given. Plan: [] Continue per plan of care.    [x] Other: Discharge formal therapy      Time In: 2:45 pm              Time Out: 3:45 pm    Electronically signed by:  Thais Araiza PT

## 2018-08-02 NOTE — DISCHARGE SUMMARY
increase in R forearm pain at the end of the session. pt with improvements in R forearm AROM, strength, and tolerance to gripping activities without an increase in R forearm pain. pt reports he would like to continue addressing his symptoms with the HEP provided. Pt was advised to decrease the frequency of tennis secondary to increasing to 3 times per week resulted in aggravation of symptoms. Pt verbalizes understanding and will be seen for an additional visit for golf assessment.                          [] No change.                          [] Other:     Longer term goals: MEET IN 8 VISITS     Pain: Pt will report less than or equal to 0-1/10 R elbow pain at rest and less than or equal to 2-3/10 R elbow pain with repetitive gripping, lifting a cup of coffee, typing, swinging, and putting activities in order to progress to prior level of activity. MET 7/26/18   ROM: Pt will demonstrate improvement in R supination AROM to greater than or equal to 90° and R wrist flexion to greater than or equal to 70° with reports of 0/10 increase in R elbow pain in order to improve tolerance to gripping, lifting, and completing ADLs.   Partially MET 7/26/18   Strength: Pt will be able to demonstrate improvement in  strength on the RUE by 10% with 0/10 R elbow pain in order to improve tolerance to gripping and lifting objects.  MET 7/26/18   Strength: Pt will be able to demonstrate 5/5 B scapular strength to promote stable base and dynamic stability when participating in recreational activities. Partially MET 7/26/18   Outcome Score: Pt will score greater than or equal to 96% on the UEFS in order to demonstrate improved function NOT MET 7/26/18   Demonstrates knowledge of fall prevention Not needed   Home Exercise Program: Pt will demonstrate independence with HEP.  MET 7/26/18         Patient goals: reduce pain/achiness, resume playing tennis     G-Codes: Visit 8  Functional Limitation: Carrying, moving, and handling objects  Functional Assessment Used: UEFS: 16.25% impairment  Current Status Modifier: CI  Goal Status Modifier: [address]         Treatment to Date:  [x] Therapeutic Exercise    [] Modalities:  [] Therapeutic Activity    [] Ultrasound  [] Electrical Stimulation  [] Gait Training     [] Massage       [] Lumbar/Cervical Traction  [] Neuromuscular Re-education [x] Cold/hotpack [] Iontophoresis: 4 mg/mL  [x] Instruction in Home Exercise Program                     Dexamethasone Sodium  [x] Manual Therapy             Phosphate 40-80 mAmin  [] Aquatic Therapy                   [] Vasocompression/    [] Other:             Game Ready    Discharge Status:        [x] Pt to continue exercise/home instructions independently. [x] Pt has completed prescribed number of treatment sessions. Electronically signed by Saravanan Caceres PT on 8/2/2018 at 9:06 AM      If you have any questions or concerns, please don't hesitate to call.   Thank you for your referral.

## 2018-09-11 ENCOUNTER — TELEPHONE (OUTPATIENT)
Dept: PRIMARY CARE CLINIC | Age: 63
End: 2018-09-11

## 2018-09-24 ENCOUNTER — OFFICE VISIT (OUTPATIENT)
Dept: PRIMARY CARE CLINIC | Age: 63
End: 2018-09-24
Payer: MEDICARE

## 2018-09-24 VITALS
SYSTOLIC BLOOD PRESSURE: 120 MMHG | WEIGHT: 173.6 LBS | RESPIRATION RATE: 18 BRPM | DIASTOLIC BLOOD PRESSURE: 74 MMHG | HEART RATE: 63 BPM | OXYGEN SATURATION: 97 % | BODY MASS INDEX: 26.4 KG/M2

## 2018-09-24 DIAGNOSIS — H40.053 RAISED INTRAOCULAR PRESSURE OF BOTH EYES: ICD-10-CM

## 2018-09-24 DIAGNOSIS — M79.10 MYALGIA: ICD-10-CM

## 2018-09-24 DIAGNOSIS — E78.5 HYPERLIPIDEMIA, UNSPECIFIED HYPERLIPIDEMIA TYPE: Primary | Chronic | ICD-10-CM

## 2018-09-24 PROCEDURE — 99214 OFFICE O/P EST MOD 30 MIN: CPT | Performed by: FAMILY MEDICINE

## 2018-09-24 ASSESSMENT — PATIENT HEALTH QUESTIONNAIRE - PHQ9
SUM OF ALL RESPONSES TO PHQ9 QUESTIONS 1 & 2: 0
2. FEELING DOWN, DEPRESSED OR HOPELESS: 0
SUM OF ALL RESPONSES TO PHQ QUESTIONS 1-9: 0
SUM OF ALL RESPONSES TO PHQ QUESTIONS 1-9: 0
1. LITTLE INTEREST OR PLEASURE IN DOING THINGS: 0

## 2018-09-24 NOTE — PROGRESS NOTES
Subjective: Marivel Wharton is in for continued evaluation and management. His chronic medical problems include the following; high cholesterol, increased intraocular pressure, and muscle aches.    's high cholesterol. He had been taking atorvastatin for several years without difficulty. He recently began noticing muscle aches, mostly in his legs. He also has muscle aches in other parts of his body. He stopped taking the Lipitor approximately 10 days ago, and has noticed an improvement in his symptoms. He does have increased intraocular pressure. He is under the care of ophthalmology. He takes Xalatan eyedrops. As stated above. He is complaining of muscle aches. They have somewhat improved since discontinuing atorvastatin. Review of systems per HPI, otherwise negative. Allergies; medications; past medical, surgical, family, and social history; and problem list reviewed as indicated in this encounter. Objective:  Vitals: Blood pressure 120/74, pulse 63, resp. rate 18, weight 173 lb 9.6 oz (78.7 kg), SpO2 97 %. Constitutional: He is oriented to person, place, and time. He appears well-developed and well-nourished and in no acute distress. Cardiovascular: Normal rate and regular rhythm, no murmur, rub, or gallop    Pulmonary/Chest: Effort normal and breath sounds normal. No rales or wheezes. No chest retraction. Extremities: no clubbing, cyanosis, or edema  Neurological:  CN II - XII grossly intact; no focal neurological deficits  Psychiatric:  Well groomed, well dressed. The patient maintains appropriate eye contact and does not appear to be responding to internal stimuli. No agitation      Assessment/ Plan / Medical Decision Making   Diagnosis Orders   1. Hyperlipidemia, unspecified hyperlipidemia type  Lipid Panel   2. Raised intraocular pressure of both eyes     3.  Myalgia  CK    CBC    Comprehensive Metabolic Panel    Urinalysis    TSH without Reflex       Medications, laboratory testing,

## 2018-09-25 ENCOUNTER — HOSPITAL ENCOUNTER (OUTPATIENT)
Age: 63
Discharge: HOME | End: 2018-09-25
Payer: MEDICARE

## 2018-09-25 DIAGNOSIS — M79.10 MYALGIA: ICD-10-CM

## 2018-09-25 LAB
-: NORMAL
ALBUMIN SERPL-MCNC: 4.1 G/DL (ref 3.5–5.2)
ALBUMIN/GLOBULIN RATIO: 1.5 (ref 1–2.5)
ALP BLD-CCNC: 60 U/L (ref 40–129)
ALT SERPL-CCNC: 21 U/L (ref 5–41)
AMORPHOUS: NORMAL
ANION GAP SERPL CALCULATED.3IONS-SCNC: 10 MMOL/L (ref 9–17)
AST SERPL-CCNC: 18 U/L
BACTERIA: NORMAL
BILIRUB SERPL-MCNC: 0.28 MG/DL (ref 0.3–1.2)
BILIRUBIN URINE: NEGATIVE
BUN BLDV-MCNC: 13 MG/DL (ref 8–23)
BUN/CREAT BLD: ABNORMAL (ref 9–20)
CALCIUM SERPL-MCNC: 9.3 MG/DL (ref 8.6–10.4)
CASTS UA: NORMAL /LPF (ref 0–8)
CHLORIDE BLD-SCNC: 103 MMOL/L (ref 98–107)
CO2: 27 MMOL/L (ref 20–31)
COLOR: YELLOW
COMMENT UA: ABNORMAL
CREAT SERPL-MCNC: 0.97 MG/DL (ref 0.7–1.2)
CRYSTALS, UA: NORMAL /HPF
EPITHELIAL CELLS UA: NORMAL /HPF (ref 0–5)
GFR AFRICAN AMERICAN: >60 ML/MIN
GFR NON-AFRICAN AMERICAN: >60 ML/MIN
GFR SERPL CREATININE-BSD FRML MDRD: ABNORMAL ML/MIN/{1.73_M2}
GFR SERPL CREATININE-BSD FRML MDRD: ABNORMAL ML/MIN/{1.73_M2}
GLUCOSE BLD-MCNC: 87 MG/DL (ref 70–99)
GLUCOSE URINE: NEGATIVE
HCT VFR BLD CALC: 44.5 % (ref 40.7–50.3)
HEMOGLOBIN: 14.3 G/DL (ref 13–17)
KETONES, URINE: NEGATIVE
LEUKOCYTE ESTERASE, URINE: ABNORMAL
MCH RBC QN AUTO: 34 PG (ref 25.2–33.5)
MCHC RBC AUTO-ENTMCNC: 32.1 G/DL (ref 28.4–34.8)
MCV RBC AUTO: 106 FL (ref 82.6–102.9)
MUCUS: NORMAL
NITRITE, URINE: NEGATIVE
NRBC AUTOMATED: 0 PER 100 WBC
OTHER OBSERVATIONS UA: NORMAL
PDW BLD-RTO: 13.9 % (ref 11.8–14.4)
PH UA: 5 (ref 5–8)
PLATELET # BLD: 196 K/UL (ref 138–453)
PMV BLD AUTO: 10.4 FL (ref 8.1–13.5)
POTASSIUM SERPL-SCNC: 5.2 MMOL/L (ref 3.7–5.3)
PROTEIN UA: NEGATIVE
RBC # BLD: 4.2 M/UL (ref 4.21–5.77)
RBC UA: NORMAL /HPF (ref 0–4)
RENAL EPITHELIAL, UA: NORMAL /HPF
SODIUM BLD-SCNC: 140 MMOL/L (ref 135–144)
SPECIFIC GRAVITY UA: 1.01 (ref 1–1.03)
TOTAL CK: 160 U/L (ref 39–308)
TOTAL PROTEIN: 6.8 G/DL (ref 6.4–8.3)
TRICHOMONAS: NORMAL
TSH SERPL DL<=0.05 MIU/L-ACNC: 2.35 MIU/L (ref 0.3–5)
TURBIDITY: CLEAR
URINE HGB: NEGATIVE
UROBILINOGEN, URINE: NORMAL
WBC # BLD: 4.1 K/UL (ref 3.5–11.3)
WBC UA: NORMAL /HPF (ref 0–5)
YEAST: NORMAL

## 2018-09-25 PROCEDURE — 36415 COLL VENOUS BLD VENIPUNCTURE: CPT

## 2018-09-25 PROCEDURE — 82550 ASSAY OF CK (CPK): CPT

## 2018-09-25 PROCEDURE — 81001 URINALYSIS AUTO W/SCOPE: CPT

## 2018-09-25 PROCEDURE — 80053 COMPREHEN METABOLIC PANEL: CPT

## 2018-09-25 PROCEDURE — 85027 COMPLETE CBC AUTOMATED: CPT

## 2018-09-25 PROCEDURE — 84443 ASSAY THYROID STIM HORMONE: CPT

## 2018-11-07 ENCOUNTER — HOSPITAL ENCOUNTER (OUTPATIENT)
Age: 63
Discharge: HOME | End: 2018-11-07
Payer: MEDICARE

## 2018-11-07 DIAGNOSIS — E78.5 HYPERLIPIDEMIA, UNSPECIFIED HYPERLIPIDEMIA TYPE: Chronic | ICD-10-CM

## 2018-11-07 LAB
CHOLESTEROL/HDL RATIO: 4.4
CHOLESTEROL: 211 MG/DL
HDLC SERPL-MCNC: 48 MG/DL
LDL CHOLESTEROL: 140 MG/DL (ref 0–130)
TRIGL SERPL-MCNC: 114 MG/DL
VLDLC SERPL CALC-MCNC: ABNORMAL MG/DL (ref 1–30)

## 2018-11-07 PROCEDURE — 80061 LIPID PANEL: CPT

## 2018-11-07 PROCEDURE — 36415 COLL VENOUS BLD VENIPUNCTURE: CPT

## 2018-11-10 DIAGNOSIS — E78.5 HYPERLIPIDEMIA, UNSPECIFIED HYPERLIPIDEMIA TYPE: Primary | Chronic | ICD-10-CM

## 2018-11-10 RX ORDER — ATORVASTATIN CALCIUM 20 MG/1
20 TABLET, FILM COATED ORAL DAILY
Qty: 90 TABLET | Refills: 1 | Status: SHIPPED | OUTPATIENT
Start: 2018-11-10 | End: 2019-01-21 | Stop reason: ALTCHOICE

## 2018-11-12 ENCOUNTER — TELEPHONE (OUTPATIENT)
Dept: PRIMARY CARE CLINIC | Age: 63
End: 2018-11-12

## 2018-11-12 DIAGNOSIS — E78.5 HYPERLIPIDEMIA, UNSPECIFIED HYPERLIPIDEMIA TYPE: Primary | Chronic | ICD-10-CM

## 2018-11-12 RX ORDER — PRAVASTATIN SODIUM 20 MG
20 TABLET ORAL DAILY
Qty: 30 TABLET | Refills: 2 | Status: SHIPPED | OUTPATIENT
Start: 2018-11-12 | End: 2019-02-10 | Stop reason: SDUPTHER

## 2018-11-12 NOTE — TELEPHONE ENCOUNTER
Phone call to patient. Notified patient of medication that was prescribed and lab work that was ordered. Patient voiced understanding. Mailed labs to patients home.

## 2018-12-27 ENCOUNTER — HOSPITAL ENCOUNTER (OUTPATIENT)
Age: 63
Discharge: HOME | End: 2018-12-27
Payer: MEDICARE

## 2018-12-27 DIAGNOSIS — E78.5 HYPERLIPIDEMIA, UNSPECIFIED HYPERLIPIDEMIA TYPE: Chronic | ICD-10-CM

## 2018-12-27 LAB
ALT SERPL-CCNC: 18 U/L (ref 5–41)
AST SERPL-CCNC: 16 U/L

## 2018-12-27 PROCEDURE — 84450 TRANSFERASE (AST) (SGOT): CPT

## 2018-12-27 PROCEDURE — 84460 ALANINE AMINO (ALT) (SGPT): CPT

## 2018-12-27 PROCEDURE — 36415 COLL VENOUS BLD VENIPUNCTURE: CPT

## 2019-01-12 ENCOUNTER — HOSPITAL ENCOUNTER (OUTPATIENT)
Age: 64
Discharge: HOME | End: 2019-01-12
Payer: MEDICARE

## 2019-01-12 ENCOUNTER — HOSPITAL ENCOUNTER (OUTPATIENT)
Dept: GENERAL RADIOLOGY | Age: 64
Discharge: HOME | End: 2019-01-14
Payer: MEDICARE

## 2019-01-12 DIAGNOSIS — N20.0 KIDNEY STONE: ICD-10-CM

## 2019-01-12 PROCEDURE — 74018 RADEX ABDOMEN 1 VIEW: CPT

## 2019-01-21 ENCOUNTER — OFFICE VISIT (OUTPATIENT)
Dept: PRIMARY CARE CLINIC | Age: 64
End: 2019-01-21
Payer: MEDICARE

## 2019-01-21 VITALS
WEIGHT: 174 LBS | HEART RATE: 61 BPM | RESPIRATION RATE: 16 BRPM | SYSTOLIC BLOOD PRESSURE: 110 MMHG | DIASTOLIC BLOOD PRESSURE: 70 MMHG | BODY MASS INDEX: 26.46 KG/M2

## 2019-01-21 DIAGNOSIS — H40.9 GLAUCOMA, UNSPECIFIED GLAUCOMA TYPE, UNSPECIFIED LATERALITY: ICD-10-CM

## 2019-01-21 DIAGNOSIS — E78.5 HYPERLIPIDEMIA, UNSPECIFIED HYPERLIPIDEMIA TYPE: Primary | Chronic | ICD-10-CM

## 2019-01-21 DIAGNOSIS — M77.11 LATERAL EPICONDYLITIS OF RIGHT ELBOW: ICD-10-CM

## 2019-01-21 PROCEDURE — 99214 OFFICE O/P EST MOD 30 MIN: CPT | Performed by: FAMILY MEDICINE

## 2019-01-21 RX ORDER — TIMOLOL MALEATE 5 MG/ML
SOLUTION/ DROPS OPHTHALMIC
Refills: 1 | COMMUNITY
Start: 2019-01-09

## 2019-02-10 DIAGNOSIS — E78.5 HYPERLIPIDEMIA, UNSPECIFIED HYPERLIPIDEMIA TYPE: Chronic | ICD-10-CM

## 2019-02-11 RX ORDER — PRAVASTATIN SODIUM 20 MG
TABLET ORAL
Qty: 30 TABLET | Refills: 5 | Status: SHIPPED | OUTPATIENT
Start: 2019-02-11 | End: 2019-04-30 | Stop reason: SDUPTHER

## 2019-03-05 ENCOUNTER — HOSPITAL ENCOUNTER (OUTPATIENT)
Dept: CT IMAGING | Age: 64
Discharge: HOME | End: 2019-03-07
Payer: MEDICARE

## 2019-03-05 DIAGNOSIS — R10.84 GENERALIZED ABDOMINAL PAIN: ICD-10-CM

## 2019-03-05 DIAGNOSIS — N20.0 CALCULUS OF KIDNEY: ICD-10-CM

## 2019-03-05 PROCEDURE — 74176 CT ABD & PELVIS W/O CONTRAST: CPT

## 2019-03-11 ENCOUNTER — TELEPHONE (OUTPATIENT)
Dept: PRIMARY CARE CLINIC | Age: 64
End: 2019-03-11

## 2019-03-15 DIAGNOSIS — M77.11 LATERAL EPICONDYLITIS OF RIGHT ELBOW: ICD-10-CM

## 2019-04-08 ENCOUNTER — TELEPHONE (OUTPATIENT)
Dept: PRIMARY CARE CLINIC | Age: 64
End: 2019-04-08

## 2019-04-08 NOTE — TELEPHONE ENCOUNTER
Pt called and wanted to know if he needed labs before his Medicare Wellness appointment? If he does he would like an order put in the system so he can go to the [facility] lab. He also would like another referral to go to Physical Therapy. Please call him to clarify any information.

## 2019-04-09 NOTE — TELEPHONE ENCOUNTER
Please advise the patient that we can order the labs at his visit as well as the order for physical therapy.

## 2019-04-30 ENCOUNTER — OFFICE VISIT (OUTPATIENT)
Dept: PRIMARY CARE CLINIC | Age: 64
End: 2019-04-30
Payer: MEDICARE

## 2019-04-30 VITALS
HEART RATE: 57 BPM | WEIGHT: 178 LBS | SYSTOLIC BLOOD PRESSURE: 108 MMHG | DIASTOLIC BLOOD PRESSURE: 60 MMHG | HEIGHT: 68 IN | BODY MASS INDEX: 26.98 KG/M2 | RESPIRATION RATE: 16 BRPM

## 2019-04-30 DIAGNOSIS — E78.5 HYPERLIPIDEMIA, UNSPECIFIED HYPERLIPIDEMIA TYPE: Chronic | ICD-10-CM

## 2019-04-30 DIAGNOSIS — Z13.1 ENCOUNTER FOR SCREENING FOR DIABETES MELLITUS: ICD-10-CM

## 2019-04-30 DIAGNOSIS — Z11.59 ENCOUNTER FOR HEPATITIS C SCREENING TEST FOR LOW RISK PATIENT: ICD-10-CM

## 2019-04-30 DIAGNOSIS — Z00.00 ROUTINE GENERAL MEDICAL EXAMINATION AT A HEALTH CARE FACILITY: Primary | ICD-10-CM

## 2019-04-30 DIAGNOSIS — M77.11 LATERAL EPICONDYLITIS OF RIGHT ELBOW: ICD-10-CM

## 2019-04-30 PROCEDURE — G0438 PPPS, INITIAL VISIT: HCPCS | Performed by: FAMILY MEDICINE

## 2019-04-30 RX ORDER — PRAVASTATIN SODIUM 20 MG
20 TABLET ORAL DAILY
Qty: 90 TABLET | Refills: 3 | Status: SHIPPED | OUTPATIENT
Start: 2019-04-30 | End: 2020-03-31 | Stop reason: SDUPTHER

## 2019-04-30 ASSESSMENT — LIFESTYLE VARIABLES
AUDIT TOTAL SCORE: 1
HAS A RELATIVE, FRIEND, DOCTOR, OR ANOTHER HEALTH PROFESSIONAL EXPRESSED CONCERN ABOUT YOUR DRINKING OR SUGGESTED YOU CUT DOWN: 0
HOW OFTEN DURING THE LAST YEAR HAVE YOU HAD A FEELING OF GUILT OR REMORSE AFTER DRINKING: 0
AUDIT-C TOTAL SCORE: 1
HOW OFTEN DURING THE LAST YEAR HAVE YOU FAILED TO DO WHAT WAS NORMALLY EXPECTED FROM YOU BECAUSE OF DRINKING: 0
HOW OFTEN DURING THE LAST YEAR HAVE YOU NEEDED AN ALCOHOLIC DRINK FIRST THING IN THE MORNING TO GET YOURSELF GOING AFTER A NIGHT OF HEAVY DRINKING: 0
HOW OFTEN DO YOU HAVE SIX OR MORE DRINKS ON ONE OCCASION: 0
HOW OFTEN DO YOU HAVE A DRINK CONTAINING ALCOHOL: 1
HOW OFTEN DURING THE LAST YEAR HAVE YOU BEEN UNABLE TO REMEMBER WHAT HAPPENED THE NIGHT BEFORE BECAUSE YOU HAD BEEN DRINKING: 0
HAVE YOU OR SOMEONE ELSE BEEN INJURED AS A RESULT OF YOUR DRINKING: 0
HOW MANY STANDARD DRINKS CONTAINING ALCOHOL DO YOU HAVE ON A TYPICAL DAY: 0
HOW OFTEN DURING THE LAST YEAR HAVE YOU FOUND THAT YOU WERE NOT ABLE TO STOP DRINKING ONCE YOU HAD STARTED: 0

## 2019-04-30 ASSESSMENT — ANXIETY QUESTIONNAIRES: GAD7 TOTAL SCORE: 0

## 2019-04-30 ASSESSMENT — PATIENT HEALTH QUESTIONNAIRE - PHQ9
SUM OF ALL RESPONSES TO PHQ QUESTIONS 1-9: 0
SUM OF ALL RESPONSES TO PHQ QUESTIONS 1-9: 0

## 2019-04-30 NOTE — PATIENT INSTRUCTIONS
Personalized Preventive Plan for Yuan Amanda - 4/30/2019  Medicare offers a range of preventive health benefits. Some of the tests and screenings are paid in full while other may be subject to a deductible, co-insurance, and/or copay. Some of these benefits include a comprehensive review of your medical history including lifestyle, illnesses that may run in your family, and various assessments and screenings as appropriate. After reviewing your medical record and screening and assessments performed today your provider may have ordered immunizations, labs, imaging, and/or referrals for you. A list of these orders (if applicable) as well as your Preventive Care list are included within your After Visit Summary for your review. Other Preventive Recommendations:    · A preventive eye exam performed by an eye specialist is recommended every 1-2 years to screen for glaucoma; cataracts, macular degeneration, and other eye disorders. · A preventive dental visit is recommended every 6 months. · Try to get at least 150 minutes of exercise per week or 10,000 steps per day on a pedometer . · Order or download the FREE \"Exercise & Physical Activity: Your Everyday Guide\" from [employer]. Call 4-835.166.3687 or search [employer] online. · You need 7606-3792 mg of calcium and 5430-7918 IU of vitamin D per day. It is possible to meet your calcium requirement with diet alone, but a vitamin D supplement is usually necessary to meet this goal.  · When exposed to the sun, use a sunscreen that protects against both UVA and UVB radiation with an SPF of 30 or greater. Reapply every 2 to 3 hours or after sweating, drying off with a towel, or swimming. · Always wear a seat belt when traveling in a car. Always wear a helmet when riding a bicycle or motorcycle.   · Consider contacting an  for a durable power of /living well

## 2019-04-30 NOTE — PROGRESS NOTES
Services Due: see orders and patient instructions/AVS.  .   Recommended screening schedule for the next 5-10 years is provided to the patient in written form: see Patient Instructions/AVS.

## 2019-05-06 ENCOUNTER — HOSPITAL ENCOUNTER (OUTPATIENT)
Dept: PHYSICAL THERAPY | Facility: CLINIC | Age: 64
Setting detail: THERAPIES SERIES
Discharge: HOME | End: 2019-05-06
Payer: MEDICARE

## 2019-05-06 PROCEDURE — 97033 APP MDLTY 1+IONTPHRSIS EA 15: CPT

## 2019-05-06 PROCEDURE — 97110 THERAPEUTIC EXERCISES: CPT

## 2019-05-06 PROCEDURE — 97161 PT EVAL LOW COMPLEX 20 MIN: CPT

## 2019-05-06 PROCEDURE — 97140 MANUAL THERAPY 1/> REGIONS: CPT

## 2019-05-06 NOTE — CONSULTS
Disability  [] Other:             Return to work:   Job/ADL Description:maintenance/long term part time- [employer]    Pain:  [x] Yes  [] No Location: R elbow Pain Rating: (0-10 scale) 6/10  Pain altered Tx:  [] Yes  [x] No  Action:  Symptoms:  [] Improving [] Worsening [x] Same  Better:  [] AM    [] PM    [] Sit    [] Rise/Sit    []Stand    [] Walk    [] Lying    [] Other:  Worse: [] AM    [] PM    [] Sit    [] Rise/Sit    []Stand    [] Walk    [] Lying    [x] Bend                             [] Valsalva    [] Other:  Sleep: [] OK    [x] Disturbed-wearing brace at night, tylenol/advil prn     Objective:     ROM  °A/P END FEEL STRENGTH TESTS (+/-) Left Right Not Tested    Left Right  Left Right Drop Arm   []   Sit Shld Flex WNL WNL  5 5 Sulcus Sign   []   Sit Shld Abd WNL WNL  5 5 Apprehension   []   Sit Shld IR WNL WNL  5 5 Yergasons   []   Shoulder Flex      Speeds   []   Ext      Neer   []   ABD      Sutton    []   ER @ 0 45 90 WNL WNL  5 5 Painful Arc   []   IR      Tinel   []   Supraspinatus            Infraspinatus            Serratus Ant            Pectoralis            Lats            Mid Trap            Lower Trap            Elbow Flex. WNL WNL  5 5       Elbow Ext. WNL WNL  5 4+       Pronation 90 85  5 5       Supination 85 70  5 5       Wrist Flex. 75 70  5 5       Wrist Ext. 75 75  5 4       Rad.  Dev.     5       Ulnar Dev.     5        40.6 lb 25 lbs              OBSERVATION No Deficit Deficit Not Tested Comments   Forward Head [] [x] []    Rounded Shoulders [] [x] []    Kyphosis [] [] []    Scap Height/Position [] [] []    Winging [] [] []    SH Rhythm [] [] []    INSPECTION/PALPATION    sig tenderness over lateral epicondyle and extensor muscles in the R forearm       SC/AC Joint [] [] []    Supraspinatus [] [] []    Biceps tendon/groove [] [] []    Posterior shld [] [] []    Subscapularis [] [] []    NEUROLOGICAL       Cervical ROM/Quadrant [] [] []    Reflexes [] [] [] Compression/Distraction [] [] []    Sensation [] [] []        FUNCTION Normal Difficult Unable   Overhead reach [] [] []   Underarm reach  [] [] []   Groom/Dress [] [] []   Bra/Shirt tuck [] [] []   Lift/Carry [] [x] []    [] [] []     Comments:    Assessment:  Problems:    [x] ? Pain:R elbow  [x] ? ROM: R wrist  [x] ? Strength: R wrist ext, R   [x] ? Function: dist sleep, difficultly playing tennis, lifting coffee cup, gripping and opening a jar. [x] Other: UEFS-55/80, 68%    STG: (to be met in 12 treatments)  1. ? Pain: 2-3/10  2. ? ROM: R wrist WNL  3. ? Strength: R wrist ext grossly 5/5, incr  strength by 10lbs  4. ? Function: Able to sleep, /open jar, lift and carry w/o diffiuclty  5. Independent with Home Exercise Programs  6. Demonstrate Knowledge of fall prevention  LTG: (to be met in 18 treatments)  1. Return to playing tennis and golf w/o issue  2. Incr UEFS by 15% for improved function                 Patient goals: to be able to play tennis, golf again relatively pain free    Rehab Potential:  [x] Good  [] Fair  [] Poor   Suggested Professional Referral:  [x] No  [] Yes:  Barriers to Goal Achievement:  [] No  [] Yes:  Domestic Concerns:  [x] No  [] Yes:    Pt. Education:  [x] Plans/Goals, Risks/Benefits discussed  [x] Home exercise program  Method of Education: [x] Verbal  [x] Demo  [x] Written  Comprehension of Education:  [x] Verbalizes understanding. [] Demonstrates understanding. [] Needs Review. [] Demonstrates/verbalizes understanding of HEP/Ed previously given.     Treatment Plan:  [x] Therapeutic Exercise  [] Modalities:  [x] Dry Needling  [x] Therapeutic Activity  [] Ultrasound  [] Electrical Stimulation  [] Gait Training   [] Massage       [] Lumbar/Cervical Traction  [x] Neuromuscular Re-education [x] Cold/hotpack [x] Iontophoresis: 4 mg/mL  [x] Instruction in HEP             Dexamethasone Sodium  [x] Manual Therapy             Phosphate  mAmin  [] Aquatic Therapy [x] Vasocompression/    [] Other:            Game Ready   [x]  Medication allergies reviewed for use of    Dexamethasone Sodium Phosphate 4mg/ml     with iontophoresis treatments. Pt is not allergic. Frequency: 2-3 x/week for 18 visits    Todays Treatment:  Modalities:   Treatment Location  Left      Right                          Position   R elbow []          [x]  [] Supine    [] Prone   [] Side lying  [x] Sitting          Treatment Modality   2 Cold Pack   [x] Large   [] Medium    [] Cervical     _____ min    Vasocompression    __° temp    ____pressure     _____ min    Ultrasound: ______W/cm2 x  ______min              [] 1MHz   [] 3Mhz                      Duty Factor: [] 100%  [] 50%   [] 20%                  Add: [] Lidex   [] Stim    [] Gel pad      2 Iontophoresis:  IOGEL:  [] 1.5ml   [x] 2.5ml   [] 3.5ml                                            [] 1.0ml   [] 1.3ml                 Dosage:  [] 24 mA/min   [x] 40 mA/min  []  80 mA/min                Channels:   [] 1    [] 2                [x] 4mg/ml Dexamethasone Sodium Phosphate Dosage 24-80 mAmin                [] Acetic Acid       [] Other     [] Drug allergies reviewed    JR Initials           5/6/19 Date   1 Other:  Exercise, manual MFR, TPR, hawk  to wrist extensors       Precautions:  Exercises:  Exercise Reps/ Time Weight/ Level Comments         Wrist flex/ext stretch 3-5x  20 sec    Forearm pron/supin,  10x 5 sec    Wrist UD, RD 10x      10x  Towel, ball   Other: Instructed in therex per log and issued HO for HEP.  Educated pt to decr/modify activity prn and to ice daily    Specific Instructions for next treatment: monitor response form IP, progress ROM and strengthening as isacc    Evaluation Complexity:  History (Personal factors, comorbidities) [] 0 [x] 1-2 [] 3+   Exam (limitations, restrictions) [x] 1-2 [] 3 [] 4+   Clinical presentation (progression) [x] Stable [] Evolving  [] Unstable   Decision Making [x] Low [] Moderate [] High    [x] Low Complexity [] Moderate Complexity [] High Complexity       Treatment Charges: Mins Units   [x] Evaluation       [x]  Low       []  Moderate       []  High 15 1   [x]  Modalities CP/IP 15 1   [x]  Ther Exercise 15 1   [x]  Manual Therapy 15 1   []  Ther Activities     []  Aquatics     []  Vasocompression     []  Other       TOTAL TREATMENT TIME: 60    Time in: 6:30 pm    Time Out: 7:45 pm    Electronically signed by: Lyla Tucker PT        Physician Signature:________________________________Date:__________________  By signing above or cosigning this note, I have reviewed this plan of care and certify a need for medically necessary rehabilitation services.      *PLEASE SIGN ABOVE AND FAX BACK ALL PAGES*

## 2019-05-08 ENCOUNTER — HOSPITAL ENCOUNTER (OUTPATIENT)
Dept: PHYSICAL THERAPY | Facility: CLINIC | Age: 64
Setting detail: THERAPIES SERIES
Discharge: HOME | End: 2019-05-08
Payer: MEDICARE

## 2019-05-08 PROCEDURE — 97140 MANUAL THERAPY 1/> REGIONS: CPT

## 2019-05-08 PROCEDURE — 97033 APP MDLTY 1+IONTPHRSIS EA 15: CPT

## 2019-05-08 PROCEDURE — 97110 THERAPEUTIC EXERCISES: CPT

## 2019-05-08 NOTE — FLOWSHEET NOTE
[] [facility]) [facility] &  Therapy  [address]  [phone]  F: [phone] [] [address]  [facility]   [address]  P: [phone]  F: [phone] [x] [address] &  Therapy  [address] [facility]  P: [phone]  F: [phone] [] [address]  [facility]   [address]  [place]: [phone]  F: [phone]     Physical Therapy Daily Treatment Note    Date:  2019  Patient Name:  Tom House    :  1955  MRN: [id number]  Physician: Mary Ellen Downs MD             Insurance: [facility]  Medical Diagnosis:   Lateral epicondylitis of right elbow     Rehab Codes: M77.11   Onset Date: 19 (Script)      Visit# / total visits:  Cancels/No Shows:     Subjective:    Pain:  [x] Yes  [] No Location: R Lat elbow Pain Rating: (0-10 scale) 4-5/10  Pain altered Tx:  [x] No  [] Yes  Action:  Comments: Pt reports achy pain in elbow. Pt has been doing HEP and also golfed yesterday. Pt notes pain comes and goes depending on activity.     Objective:  Modalities:   Treatment Location  Left      Right                          Position   R elbow []          [x]  [] Supine    [] Prone   [] Side lying  [x] Sitting          Treatment Modality    Hot Pack:    [] Large   [] Medium    [] Cervical     _____ min   2 Cold Pack   [x] Large   [] Medium    [] Cervical     __15___ min    Vasocompression    __° temp    ____pressure     _____ min    Electrical Stim:    [] IFC     [] MFAC      [] HVPC                          Protocol:_______  _____X_____min                          #Channels:  [] 1        [] 2       [] Other:    Ultrasound: ______W/cm2 x  ______min              [] 1MHz   [] 3Mhz                      Duty Factor: [] 100%  [] 50%   [] 20%                  Add: [] Lidex   [] Stim    [] Gel pad Massage:    [] Soft Tissue   [] Cross Friction                      [] Ice ____min   2 Iontophoresis:  IOGEL:  [] 1.5ml   [x] 2.5ml   [] 3.5ml                                            [] 1.0ml   [] 1.3ml                 Dosage:  [] 24 mA/min   [x] 40 mA/min  []  80 mA/min                Channels:   [] 1    [] 2                [x] 4mg/ml Dexamethasone Sodium Phosphate Dosage 24-80 mAmin                [] Acetic Acid       [] Other     [] Drug allergies reviewed    _______Initials           _______Date    Traction:   [] Prone   [] Supine   X _______min               [] Lumbar x ____lbs      [] Cervical x _____lbs               [] Continuous     [] Intermittent  -   On:_____x Off:_____   1 Other:  Exercise, manual MFR, TPR, hawk  to wrist extensors            Precautions:  Exercises:  Exercise Reps/ Time Weight/ Level Comments             Wrist flex/ext stretch 3-5x  20 sec     Forearm pron/supin,  20x 5 sec     Wrist UD, RD 20x        10x  5 sec Towel   Other:    Specific Instructions for next treatment:    Treatment Charges: Mins Units   [x]  Modalities: IP/CP 15 1   [x]  Ther Exercise 15 1   [x]  Manual Therapy 15 1   []  Ther Activities     []  Aquatics     []  Vasocompression     []  Other     Total Treatment time 45 3       Assessment: [x] Progressing toward goals. Cont with stretching and ROM ex followed by manual tx with Hawk  to R lat forearm. Pt cont to be tight and tender over lat forearm, less after manual tx.  IP/CP end of session. Pt feels sore and feels like his \"arm had a workout\" after tx. Encouraged pt to cont with stretching and ex at home along with ice. Will cont to monitor sx.    [] No change. [] Other:    STG: (to be met in 12 treatments)  1. ? Pain: 2-3/10  2. ? ROM: R wrist WNL  3. ? Strength: R wrist ext grossly 5/5, incr  strength by 10lbs   4. ? Function: Able to sleep, /open jar, lift and carry w/o diffiuclty  5.  Independent with Home Exercise

## 2019-05-09 NOTE — FLOWSHEET NOTE
[facility] Fall Risk Assessment    Patient Name:  Ramiro Stevens  : 1955        Risk Factor Scale  Score   History of Falls [] Yes  [x] No 25  0    Secondary Diagnosis [] Yes  [x] No 15  0    Ambulatory Aid [] Furniture  [] Crutches/cane/walker  [x] None/bedrest/wheelchair/nurse 30  15  0    IV/Heparin Lock [] Yes  [x] No 20  0    Gait/Transferring [] Impaired  [] Weak  [x] Normal/bedrest/immobile 20  10  0    Mental Status [] Forgets limitations  [x] Oriented to own ability 15  0       Total:     Based on the Assessment score: check the appropriate box.     [x]  No intervention needed   Low =   Score of 0-24    []  Use standard prevention interventions Moderate =  Score of 24-44   [] Give patient handout and discuss fall prevention strategies   [] Establish goal of education for patient/family RE: fall prevention strategies    []  Use high risk prevention interventions High = Score of 45 and higher   [] Give patient handout and discuss fall prevention strategies   [] Establish goal of education for patient/family Re: fall prevention strategies   [] Discuss lifeline / other resources    Electronically signed by:   Vy Devine, PT  Date: 2019

## 2019-05-09 NOTE — PRE-CERTIFICATION NOTE
Medicare Cap   [x] Physical Therapy  [] Speech Therapy  [] Occupational therapy  *PT and Speech caps combine      $2040 Cap limit < kx modifier needed        Patient Name: Marie Lopez  YOB: 1955    Note:  This is an estimate of charges billed.      Date of [address] Name # units/ charge $$$ charge Daily Total Charge Ongoing Total $$$   5/6/19 Eval+ex+man+IP  81.73+23.17+  21.30+14.78 140.98 140.98   5/8 Ex+man+IP  29.68+21.30+  14.78 65.76 206.74

## 2019-05-10 ENCOUNTER — HOSPITAL ENCOUNTER (OUTPATIENT)
Dept: PHYSICAL THERAPY | Facility: CLINIC | Age: 64
Setting detail: THERAPIES SERIES
Discharge: HOME | End: 2019-05-10
Payer: MEDICARE

## 2019-05-10 PROCEDURE — 97140 MANUAL THERAPY 1/> REGIONS: CPT

## 2019-05-10 PROCEDURE — 97033 APP MDLTY 1+IONTPHRSIS EA 15: CPT

## 2019-05-10 PROCEDURE — 97110 THERAPEUTIC EXERCISES: CPT

## 2019-05-10 NOTE — FLOWSHEET NOTE
Knowledge of fall prevention  LTG: (to be met in 18 treatments)  1. Return to playing tennis and golf w/o issue  2. Incr UEFS by 15% for improved function                 Patient goals: to be able to play tennis, golf again relatively pain free        Pt. Education:  [x] Yes  [] No  [x] Reviewed Prior HEP/Ed  Method of Education: [x] Verbal  [] Demo  [] Written  Comprehension of Education:  [x] Verbalizes understanding. [] Demonstrates understanding. [] Needs review. [] Demonstrates/verbalizes HEP/Ed previously given. Plan: [x] Continue per plan of care.    [] Other:      Time In:10:05 AM            Time Out:11:00 AM    Electronically signed by:  Dina Carvalho PTA

## 2019-05-13 ENCOUNTER — HOSPITAL ENCOUNTER (OUTPATIENT)
Dept: PHYSICAL THERAPY | Facility: CLINIC | Age: 64
Setting detail: THERAPIES SERIES
Discharge: HOME | End: 2019-05-13
Payer: MEDICARE

## 2019-05-13 PROCEDURE — 97033 APP MDLTY 1+IONTPHRSIS EA 15: CPT

## 2019-05-13 PROCEDURE — 97140 MANUAL THERAPY 1/> REGIONS: CPT

## 2019-05-13 PROCEDURE — 97110 THERAPEUTIC EXERCISES: CPT

## 2019-05-13 NOTE — FLOWSHEET NOTE
[] [facility]) [facility] &  Therapy  [address]  [phone]  F: [phone] [] [address]  [place] [address]   [address]  P: [phone]  F: [phone] [x] [address]  Therapy  [address] [facility] [address]  P: [phone]  F: [phone] [] [address]  [facility]   [address]  [place]: [phone]  F: [phone]     Physical Therapy Daily Treatment Note    Date:  2019  Patient Name:  Tish Gaona    :  1955  MRN: [id number]  Physician: Samaria Joaquin MD             Insurance: [facility]  Medical Diagnosis:   Lateral epicondylitis of right elbow     Rehab Codes: M77.11   Onset Date: 19 (Script)      Visit# / total visits:  Cancels/No Shows:     Subjective:    Pain:  [x] Yes  [] No Location: R Lat elbow Pain Rating: (0-10 scale) 4-5/10  Pain altered Tx:  [x] No  [] Yes  Action:    Comments:  Pt mentioned that he realizes that he may need to slow down in his activities but he is still golfing and playing tennis.      Objective:  Modalities:   Treatment Location  Left      Right                          Position   R elbow []          [x]  [] Supine    [] Prone   [] Side lying  [x] Sitting          Treatment Modality    Hot Pack:    [] Large   [] Medium    [] Cervical     _____ min   2 Cold Pack   [x] Large   [] Medium    [] Cervical     __15___ min    Vasocompression    __° temp    ____pressure     _____ min    Electrical Stim:    [] IFC     [] MFAC      [] HVPC                          Protocol:_______  _____X_____min                          #Channels:  [] 1        [] 2       [] Other:    Ultrasound: ______W/cm2 x  ______min              [] 1MHz   [] 3Mhz                      Duty Factor: [] 100%  [] 50%   [] 20%                  Add: [] Lidex   [] Stim    [] Gel pad Massage:    [] Soft Tissue   [] Cross Friction                      [] Ice ____min   2 Iontophoresis:  IOGEL:  [] 1.5ml   [x] 2.5ml   [] 3.5ml                                            [] 1.0ml   [] 1.3ml                 Dosage:  [] 24 mA/min   [x] 40 mA/min  []  80 mA/min                Channels:   [] 1    [] 2                [x] 4mg/ml Dexamethasone Sodium Phosphate Dosage 24-80 mAmin                [] Acetic Acid       [] Other     [] Drug allergies reviewed    _______Initials           _______Date    Traction:   [] Prone   [] Supine   X _______min               [] Lumbar x ____lbs      [] Cervical x _____lbs               [] Continuous     [] Intermittent  -   On:_____x Off:_____   1 Other:  Exercise, manual MFR, TPR, hawk  to wrist extensors            Precautions:  Exercises:  Exercise Reps/ Time Weight/ Level Comments   UBE 10m       Wrist flex/ext stretch 3-5x  20 sec     Forearm pron/supin,  20x 5 sec     Wrist UD, RD 20x        10x  5 sec Towel   Other:    Specific Instructions for next treatment:    Treatment Charges: Mins Units   [x]  Modalities: IP/CP 15 1   [x]  Ther Exercise 15 1   [x]  Manual Therapy 15 1   []  Ther Activities     []  Aquatics     []  Vasocompression     []  Other     Total Treatment time 45 3       Assessment: [x] Progressing toward goals. [] No change. [x] Other: Initiated treatment with UBE to warm up. Educated pt on loosening his  with tennis racket and golf club if he was going to keep playing. Pt realized that he was gripping the UBE handle tight towards end. Continued with stretches then proceeded to manual releases of wrist extensors along with hawks . Finished with IP/CP. STG: (to be met in 12 treatments)  1. ? Pain: 2-3/10  2. ? ROM: R wrist WNL  3. ? Strength: R wrist ext grossly 5/5, incr  strength by 10lbs   4. ? Function: Able to sleep, /open jar, lift and carry w/o diffiuclty  5.  Independent with Home Exercise Programs  6. Demonstrate Knowledge of fall prevention  LTG: (to be met in 18 treatments)  1. Return to playing tennis and golf w/o issue  2. Incr UEFS by 15% for improved function                 Patient goals: to be able to play tennis, golf again relatively pain free        Pt. Education:  [x] Yes  [] No  [x] Reviewed Prior HEP/Ed  Method of Education: [x] Verbal  [] Demo  [] Written  Comprehension of Education:  [x] Verbalizes understanding. [] Demonstrates understanding. [] Needs review. [] Demonstrates/verbalizes HEP/Ed previously given. Plan: [x] Continue per plan of care.    [] Other:      Time In:1:00pm            Time Out:2:00pm    Electronically signed by:  Yuri Allan PTA

## 2019-05-13 NOTE — PRE-CERTIFICATION NOTE
Medicare Cap   [x] Physical Therapy  [] Speech Therapy  [] Occupational therapy  *PT and Speech caps combine      $2040 Cap limit < kx modifier needed        Patient Name: Jayleen Leger  YOB: 1955    Note:  This is an estimate of charges billed.      Date of [address] Name # units/ charge $$$ charge Daily Total Charge Ongoing Total $$$   5/6/19 Eval+ex+man+IP  81.73+23.17+  21.30+14.78 140.98 140.98   5/8 Ex+man+IP  29.68+21.30+14.78 65.76 206.74   5/10 Ex+man+IP  29.68+21.30+14.78 65.76 272.50   5/13 Ex+man+IP  29.68+21.30+14.78 65.76 338.26

## 2019-05-14 ENCOUNTER — APPOINTMENT (OUTPATIENT)
Dept: PHYSICAL THERAPY | Facility: CLINIC | Age: 64
End: 2019-05-14
Payer: MEDICARE

## 2019-05-15 ENCOUNTER — HOSPITAL ENCOUNTER (OUTPATIENT)
Dept: PHYSICAL THERAPY | Facility: CLINIC | Age: 64
Setting detail: THERAPIES SERIES
Discharge: HOME | End: 2019-05-15
Payer: MEDICARE

## 2019-05-15 PROCEDURE — 97110 THERAPEUTIC EXERCISES: CPT

## 2019-05-15 PROCEDURE — 97140 MANUAL THERAPY 1/> REGIONS: CPT

## 2019-05-15 PROCEDURE — 97033 APP MDLTY 1+IONTPHRSIS EA 15: CPT

## 2019-05-15 NOTE — FLOWSHEET NOTE
[] [facility]) [facility] &  Therapy  [address]  [phone]  F: [phone] [] [address]  [facility]   [address]  P: [phone]  F: [phone] [x] [address]  Therapy  [address] [facility]  P: [phone]  F: [phone] [] [address]  [facility]   [address]  [place]: [phone]  F: [phone]     Physical Therapy Daily Treatment Note    Date:  5/15/2019  Patient Name:  Rachael Shaw    :  1955  MRN: [id number]  Physician: Cher Ham MD             Insurance: [facility]  Medical Diagnosis:   Lateral epicondylitis of right elbow     Rehab Codes: M77.11   Onset Date: 19 (Script)      Visit# / total visits:  Cancels/No Shows:     Subjective:    Pain:  [x] Yes  [] No Location: R Lat elbow Pain Rating: (0-10 scale) 4/10  Pain altered Tx:  [x] No  [] Yes  Action:    Comments:  Pt reports just coming from work and feels sore after emptying trash and doing increased UE activity.     Objective:  Modalities:   Treatment Location  Left      Right                          Position   R elbow []          [x]  [] Supine    [] Prone   [] Side lying  [x] Sitting          Treatment Modality    Hot Pack:    [] Large   [] Medium    [] Cervical     _____ min   2 Cold Pack   [x] Large   [] Medium    [] Cervical     __15___ min    Vasocompression    __° temp    ____pressure     _____ min    Electrical Stim:    [] IFC     [] MFAC      [] HVPC                          Protocol:_______  _____X_____min                          #Channels:  [] 1        [] 2       [] Other:    Ultrasound: ______W/cm2 x  ______min              [] 1MHz   [] 3Mhz                      Duty Factor: [] 100%  [] 50%   [] 20%                  Add: [] Lidex   [] Stim    [] Gel pad       Massage:    [] Soft Tissue   [] Cross Friction                      [] Ice ____min   2 Iontophoresis:  IOGEL:  [] 1.5ml   [x] 2.5ml   [] 3.5ml                                            [] 1.0ml   [] 1.3ml                 Dosage:  [] 24 mA/min   [x] 40 mA/min  []  80 mA/min                Channels:   [] 1    [] 2                [x] 4mg/ml Dexamethasone Sodium Phosphate Dosage 24-80 mAmin                [] Acetic Acid       [] Other     [] Drug allergies reviewed    _______Initials           _______Date    Traction:   [] Prone   [] Supine   X _______min               [] Lumbar x ____lbs      [] Cervical x _____lbs               [] Continuous     [] Intermittent  -   On:_____x Off:_____   1 Other:  Exercise, manual MFR, TPR, hawk  to wrist extensors            Precautions:  Exercises:  Exercise Reps/ Time Weight/ Level Comments   UBE 10m  L2     Wrist flex/ext stretch 3-5x  20 sec     Forearm pron/supin,  20x 5 sec     Wrist UD, RD 20x        10x  5 sec Towel   Other:    Specific Instructions for next treatment:    Treatment Charges: Mins Units   [x]  Modalities: IP/CP 15 1   [x]  Ther Exercise 20 1   [x]  Manual Therapy 15 1   []  Ther Activities     []  Aquatics     []  Vasocompression     []  Other     Total Treatment time 50 3       Assessment: [x] Progressing toward goals. [] No change. [x] Other:Cont with ex and stretching per flow sheet with good isacc. Manual with [facility]  this date with less tenderness noted per pt. Cont to feel tightness of wrist extensors, but less after tx.  CP/IP end of session. Pt notes to take 2 weeks off playing golf and tennis then gradually return to play. Pt to see if this helps reduce pain. STG: (to be met in 12 treatments)  1. ? Pain: 2-3/10  2. ? ROM: R wrist WNL  3. ? Strength: R wrist ext grossly 5/5, incr  strength by 10lbs   4. ? Function: Able to sleep, /open jar, lift and carry w/o diffiuclty  5. Independent with Home Exercise Programs  6.  Demonstrate Knowledge of fall prevention  LTG: (to be met in 18 treatments)  1. Return to playing tennis and golf w/o issue  2. Incr UEFS by 15% for improved function                 Patient goals: to be able to play tennis, golf again relatively pain free        Pt. Education:  [x] Yes  [] No  [x] Reviewed Prior HEP/Ed  Method of Education: [x] Verbal  [] Demo  [] Written  Comprehension of Education:  [x] Verbalizes understanding. [] Demonstrates understanding. [] Needs review. [] Demonstrates/verbalizes HEP/Ed previously given. Plan: [x] Continue per plan of care.    [] Other:      Time In:1005            Time Out:1100    Electronically signed by:  Josie Leblanc PTA

## 2019-05-17 ENCOUNTER — HOSPITAL ENCOUNTER (OUTPATIENT)
Dept: PHYSICAL THERAPY | Facility: CLINIC | Age: 64
Setting detail: THERAPIES SERIES
Discharge: HOME | End: 2019-05-17
Payer: MEDICARE

## 2019-05-17 ENCOUNTER — HOSPITAL ENCOUNTER (OUTPATIENT)
Age: 64
Discharge: HOME | End: 2019-05-17
Payer: MEDICARE

## 2019-05-17 DIAGNOSIS — Z11.59 ENCOUNTER FOR HEPATITIS C SCREENING TEST FOR LOW RISK PATIENT: ICD-10-CM

## 2019-05-17 DIAGNOSIS — Z13.1 ENCOUNTER FOR SCREENING FOR DIABETES MELLITUS: ICD-10-CM

## 2019-05-17 DIAGNOSIS — Z00.00 ROUTINE GENERAL MEDICAL EXAMINATION AT A HEALTH CARE FACILITY: ICD-10-CM

## 2019-05-17 LAB
GLUCOSE FASTING: 96 MG/DL (ref 70–99)
HEPATITIS C ANTIBODY: NONREACTIVE

## 2019-05-17 PROCEDURE — 97140 MANUAL THERAPY 1/> REGIONS: CPT

## 2019-05-17 PROCEDURE — 82947 ASSAY GLUCOSE BLOOD QUANT: CPT

## 2019-05-17 PROCEDURE — 86803 HEPATITIS C AB TEST: CPT

## 2019-05-17 PROCEDURE — 97110 THERAPEUTIC EXERCISES: CPT

## 2019-05-17 PROCEDURE — 36415 COLL VENOUS BLD VENIPUNCTURE: CPT

## 2019-05-17 NOTE — FLOWSHEET NOTE
[] [facility]) [facility] &  Therapy  [address]  [phone]  F: [phone] [] [address]  [facility]   [address]  P: [phone]  F: [phone] [x] [address]  Therapy  [address] [facility]  P: [phone]  F: [phone] [] [address]  [facility]   [address]  [place]: [phone]  F: [phone]     Physical Therapy Daily Treatment Note    Date:  2019  Patient Name:  Yamilka Lawler    :  1955  MRN: [id number]  Physician: Cherylene Hoe, MD             Insurance: [employer]  Medical Diagnosis:   Lateral epicondylitis of right elbow     Rehab Codes: M77.11   Onset Date: 19 (Script)      Visit# / total visits:  Cancels/No Shows:     Subjective:    Pain:  [x] Yes  [] No Location: R Lat elbow Pain Rating: (0-10 scale) 3/10  Pain altered Tx:  [x] No  [] Yes  Action:    Comments:  Pt reports achy pain today but less overall. Has been golfing everyday and feels this has been going well, but has not been playing tennis.     Objective:  Modalities:   Treatment Location  Left      Right                          Position   R elbow []          [x]  [] Supine    [] Prone   [] Side lying  [x] Sitting          Treatment Modality    Hot Pack:    [] Large   [] Medium    [] Cervical     _____ min   2 Cold Pack   [x] Large   [] Medium    [] Cervical     __15___ min    Vasocompression    __° temp    ____pressure     _____ min    Electrical Stim:    [] IFC     [] MFAC      [] HVPC                          Protocol:_______  _____X_____min                          #Channels:  [] 1        [] 2       [] Other:    Ultrasound: ______W/cm2 x  ______min              [] 1MHz   [] 3Mhz                      Duty Factor: [] 100%  [] 50%   [] 20%                  Add: [] Lidex   [] Stim    [] Gel pad       Massage:    [] Soft Tissue   [] Cross Friction                      [] Ice ____min   2 Iontophoresis:  IOGEL:  [] 1.5ml   [x] 2.5ml   [] 3.5ml                                            [] 1.0ml   [] 1.3ml                 Dosage:  [] 24 mA/min   [x] 40 mA/min  []  80 mA/min                Channels:   [] 1    [] 2                [x] 4mg/ml Dexamethasone Sodium Phosphate Dosage 24-80 mAmin                [] Acetic Acid       [] Other     [] Drug allergies reviewed    _______Initials           _______Date    Traction:   [] Prone   [] Supine   X _______min               [] Lumbar x ____lbs      [] Cervical x _____lbs               [] Continuous     [] Intermittent  -   On:_____x Off:_____   1 Other:  Exercise, manual MFR, TPR, hawk  to wrist extensors            Precautions:  Exercises:  Exercise Reps/ Time Weight/ Level Comments   UBE 10m  L2     Wrist flex/ext stretch 3-5x  20 sec     Forearm pron/supin,  20x 5 sec     Wrist UD, RD 20x        10x  5 sec Towel   Other:    Specific Instructions for next treatment:    Treatment Charges: Mins Units   [x]  Modalities: IP/CP     [x]  Ther Exercise 20 1   [x]  Manual Therapy 15 1   []  Ther Activities     []  Aquatics     []  Vasocompression     []  Other     Total Treatment time 35 2       Assessment: [x] Progressing toward goals. [] No change. [x] Other:Cont with ex and stretching per flow sheet with good isacc. Manual with [facility]  this date with less tenderness noted per pt. Less tension noted overall. Pt wished to hold ice and IP today d/t having to have his blood drawn after tx. Pt notes he has small veins and was afraid the ice would make it worse. Plan to add to strengthening program next week as pt isacc.    STG: (to be met in 12 treatments)  1. ? Pain: 2-3/10  2. ? ROM: R wrist WNL  3. ? Strength: R wrist ext grossly 5/5, incr  strength by 10lbs   4. ? Function: Able to sleep, /open jar, lift and carry w/o diffiuclty  5. Independent with Home Exercise Programs  6. Demonstrate Knowledge of fall prevention  LTG: (to be met in 18 treatments)  1. Return to playing tennis and golf w/o issue  2. Incr UEFS by 15% for improved function                 Patient goals: to be able to play tennis, golf again relatively pain free        Pt. Education:  [x] Yes  [] No  [x] Reviewed Prior HEP/Ed  Method of Education: [x] Verbal  [] Demo  [] Written  Comprehension of Education:  [x] Verbalizes understanding. [] Demonstrates understanding. [] Needs review. [] Demonstrates/verbalizes HEP/Ed previously given. Plan: [x] Continue per plan of care.    [] Other:      Time In:1005            Time Out:1100    Electronically signed by:  Rene Burgess PTA

## 2019-05-20 ENCOUNTER — HOSPITAL ENCOUNTER (OUTPATIENT)
Dept: PHYSICAL THERAPY | Facility: CLINIC | Age: 64
Setting detail: THERAPIES SERIES
Discharge: HOME | End: 2019-05-20
Payer: MEDICARE

## 2019-05-20 PROCEDURE — 97140 MANUAL THERAPY 1/> REGIONS: CPT

## 2019-05-20 PROCEDURE — 97033 APP MDLTY 1+IONTPHRSIS EA 15: CPT

## 2019-05-20 PROCEDURE — 97110 THERAPEUTIC EXERCISES: CPT

## 2019-05-20 NOTE — FLOWSHEET NOTE
Friction                      [] Ice ____min   2 Iontophoresis:  IOGEL:  [] 1.5ml   [x] 2.5ml   [] 3.5ml                                            [] 1.0ml   [] 1.3ml                 Dosage:  [] 24 mA/min   [x] 40 mA/min  []  80 mA/min                Channels:   [] 1    [] 2                [x] 4mg/ml Dexamethasone Sodium Phosphate Dosage 24-80 mAmin                [] Acetic Acid       [] Other     [] Drug allergies reviewed    _______Initials           _______Date    Traction:   [] Prone   [] Supine   X _______min               [] Lumbar x ____lbs      [] Cervical x _____lbs               [] Continuous     [] Intermittent  -   On:_____x Off:_____   1 Other:  Exercise, manual MFR, TPR, hawk  to wrist extensors            Precautions:  Exercises:  Exercise Reps/ Time Weight/ Level Comments   UBE 10m  L2     Wrist flex/ext stretch 3-5x  20 sec     Forearm pron/supin,  20x 5 sec     Wrist UD, RD 20x        10x  5 sec Towel   Other:    Specific Instructions for next treatment:    Treatment Charges: Mins Units   [x]  Modalities: IP/CP 10 1   [x]  Ther Exercise 20 1   [x]  Manual Therapy 10 1   []  Ther Activities     []  Aquatics     []  Vasocompression     []  Other     Total Treatment time 40 3       Assessment: [x] Progressing toward goals. [] No change. [x] Other:Added in some strengthening exercises today with focus on wrist and hand muscles. Continued with hawks  on wrist extensors. Finished with ionto/CP to finish. STG: (to be met in 12 treatments)  1. ? Pain: 2-3/10  2. ? ROM: R wrist WNL  3. ? Strength: R wrist ext grossly 5/5, incr  strength by 10lbs   4. ? Function: Able to sleep, /open jar, lift and carry w/o diffiuclty  5. Independent with Home Exercise Programs  6. Demonstrate Knowledge of fall prevention  LTG: (to be met in 18 treatments)  1. Return to playing tennis and golf w/o issue  2.  Incr UEFS by 15% for improved function                 Patient goals: to be able to play tennis, golf again relatively pain free        Pt. Education:  [x] Yes  [] No  [x] Reviewed Prior HEP/Ed  Method of Education: [x] Verbal  [] Demo  [] Written  Comprehension of Education:  [x] Verbalizes understanding. [] Demonstrates understanding. [] Needs review. [] Demonstrates/verbalizes HEP/Ed previously given. Plan: [x] Continue per plan of care.    [] Other:      Time In:1005            Time Out:1100    Electronically signed by:  Shruthi Perdue PTA

## 2019-05-22 ENCOUNTER — HOSPITAL ENCOUNTER (OUTPATIENT)
Dept: PHYSICAL THERAPY | Facility: CLINIC | Age: 64
Setting detail: THERAPIES SERIES
Discharge: HOME | End: 2019-05-22
Payer: MEDICARE

## 2019-05-22 PROCEDURE — 97033 APP MDLTY 1+IONTPHRSIS EA 15: CPT

## 2019-05-22 PROCEDURE — 97110 THERAPEUTIC EXERCISES: CPT

## 2019-05-22 NOTE — FLOWSHEET NOTE
[] [facility] &  Therapy  [address]  [phone]  F: [phone] [] [address]  [facility]   [address]  P: [phone]  F: [phone] [x] [address]  Therapy  [address] [facility] [address]  P: [phone]  F: [phone] [] [address]  [facility]   [address]  [place]: [phone]  F: [phone]     Physical Therapy Daily Treatment Note    Date:  2019  Patient Name:  Patsy Harper    :  1955  MRN: [id number]  Physician: Solitario Stack MD             Insurance: [facility]  Medical Diagnosis:   Lateral epicondylitis of right elbow     Rehab Codes: M77.11   Onset Date: 19 (Script)      Visit# / total visits:  Cancels/No Shows:     Subjective:    Pain:  [x] Yes  [] No Location: R Lat elbow Pain Rating: (0-10 scale) 1-2/10  Pain altered Tx:  [x] No  [] Yes  Action:    Comments:  Pt mentioned that he played one round of tennis this morning and it did not feel too bad.       Objective:  Modalities:   Treatment Location  Left      Right                          Position   R elbow []          [x]  [] Supine    [] Prone   [] Side lying  [x] Sitting          Treatment Modality    Hot Pack:    [] Large   [] Medium    [] Cervical     _____ min   2 Cold Pack   [x] Large   [] Medium    [] Cervical     __15___ min    Vasocompression    __° temp    ____pressure     _____ min    Electrical Stim:    [] IFC     [] MFAC      [] HVPC                          Protocol:_______  _____X_____min                          #Channels:  [] 1        [] 2       [] Other:    Ultrasound: ______W/cm2 x  ______min              [] 1MHz   [] 3Mhz                      Duty Factor: [] 100%  [] 50%   [] 20%                  Add: [] Lidex   [] Stim    [] Gel pad       Massage:    [] Soft Tissue   [] Cross Friction                      [] Ice ____min   2 Iontophoresis:  IOGEL:  [] 1.5ml   [x] 2.5ml   [] 3.5ml                                            [] 1.0ml   [] 1.3ml                 Dosage:  [] 24 mA/min   [x] 40 mA/min  []  80 mA/min                Channels:   [] 1    [] 2                [x] 4mg/ml Dexamethasone Sodium Phosphate Dosage 24-80 mAmin                [] Acetic Acid       [] Other     [] Drug allergies reviewed    _______Initials           _______Date    Traction:   [] Prone   [] Supine   X _______min               [] Lumbar x ____lbs      [] Cervical x _____lbs               [] Continuous     [] Intermittent  -   On:_____x Off:_____   1 Other:  Exercise, manual MFR, TPR, hawk  to wrist extensors            Precautions:  Exercises:  Exercise Reps/ Time Weight/ Level Comments   UBE 10m  L2     Wrist flex/ext stretch 3-5x  20 sec     Forearm pron/supin,  20x 5 sec     Wrist UD, RD, supin, pro 20x 1 plate  Weighted bar   Yellow gripper 20x       Peach puddy 3m     Wrist roll ups 3m/3m  Blue noodle         3-way bicep curl 20x 2# Front, 45°, side          Other:    Specific Instructions for next treatment:    Treatment Charges: Mins Units   [x]  Modalities: IP/CP 10 1   [x]  Ther Exercise 30 2   []  Manual Therapy      []  Ther Activities     []  Aquatics     []  Vasocompression     []  Other     Total Treatment time 40 3       Assessment: [x] Progressing toward goals. [] No change. [x] Other: Held manual today. Continued with stretching and increased exercises today. Pt had no increased symptoms through out treatment. Finished with ionto and ice at end. Educated pt to continue stretches and light activity through the weekend. STG: (to be met in 12 treatments)  1. ? Pain: 2-3/10  2. ? ROM: R wrist WNL  3. ? Strength: R wrist ext grossly 5/5, incr  strength by 10lbs   4. ? Function: Able to sleep, /open jar, lift and carry w/o diffiuclty  5.  Independent with Home Exercise Programs  6. Demonstrate Knowledge of fall prevention  LTG: (to be met in 18 treatments)  1. Return to playing tennis and golf w/o issue  2. Incr UEFS by 15% for improved function                 Patient goals: to be able to play tennis, golf again relatively pain free        Pt. Education:  [x] Yes  [] No  [x] Reviewed Prior HEP/Ed  Method of Education: [x] Verbal  [] Demo  [] Written  Comprehension of Education:  [x] Verbalizes understanding. [] Demonstrates understanding. [] Needs review. [] Demonstrates/verbalizes HEP/Ed previously given. Plan: [x] Continue per plan of care.    [] Other:      Time In:1005            Time Out:1100    Electronically signed by:  Chela Loyd PTA

## 2019-05-24 ENCOUNTER — HOSPITAL ENCOUNTER (OUTPATIENT)
Dept: PHYSICAL THERAPY | Facility: CLINIC | Age: 64
Setting detail: THERAPIES SERIES
Discharge: HOME | End: 2019-05-24
Payer: MEDICARE

## 2019-05-24 PROCEDURE — 97110 THERAPEUTIC EXERCISES: CPT

## 2019-05-24 PROCEDURE — 97033 APP MDLTY 1+IONTPHRSIS EA 15: CPT

## 2019-05-24 NOTE — FLOWSHEET NOTE
[] [facility]) [facility] &  Therapy  [address]  [phone]  F: [phone] [] [address]  [facility]   [address]  P: [phone]  F: [phone] [x] [address] &  Therapy  [address] [facility]  P: [phone]  F: [phone] [] [address]  [facility]   [address]  [place]: [phone]  F: [phone]     Physical Therapy Daily Treatment Note    Date:  2019  Patient Name:  Chris Nguyễn    :  1955  MRN: [id number]  Physician: Floridalma Jackson MD             Insurance: [facility]  Medical Diagnosis:   Lateral epicondylitis of right elbow     Rehab Codes: M77.11   Onset Date: 19 (Script)      Visit# / total visits:  Cancels/No Shows:     Subjective:    Pain:  [x] Yes  [] No Location: R Lat elbow Pain Rating: (0-10 scale) 1-2/10  Pain altered Tx:  [x] No  [] Yes  Action:  Comments:  Pt reports soreness today, however states he thinks he \"over did it\" playing tennis this past week.        Objective:  Modalities:   Treatment Location  Left      Right                          Position   R elbow []          [x]  [] Supine    [] Prone   [] Side lying  [x] Sitting          Treatment Modality    Hot Pack:    [] Large   [] Medium    [] Cervical     _____ min   2 Cold Pack   [x] Large   [] Medium    [] Cervical     __15___ min    Vasocompression    __° temp    ____pressure     _____ min    Electrical Stim:    [] IFC     [] MFAC      [] HVPC                          Protocol:_______  _____X_____min                          #Channels:  [] 1        [] 2       [] Other:    Ultrasound: ______W/cm2 x  ______min              [] 1MHz   [] 3Mhz                      Duty Factor: [] 100%  [] 50%   [] 20%                  Add: [] Lidex   [] Stim    [] Gel pad       Massage:    [] Soft Knowledge of fall prevention  LTG: (to be met in 18 treatments)  1. Return to playing tennis and golf w/o issue  2. Incr UEFS by 15% for improved function                 Patient goals: to be able to play tennis, golf again relatively pain free        Pt. Education:  [x] Yes  [] No  [x] Reviewed Prior HEP/Ed  Method of Education: [x] Verbal  [] Demo  [] Written  Comprehension of Education:  [x] Verbalizes understanding. [] Demonstrates understanding. [] Needs review. [] Demonstrates/verbalizes HEP/Ed previously given. Plan: [x] Continue per plan of care.    [] Other:      Time In: 1005            Time Out: 1100    Electronically signed by:  Azar Queen PTA

## 2019-05-29 ENCOUNTER — HOSPITAL ENCOUNTER (OUTPATIENT)
Dept: PHYSICAL THERAPY | Facility: CLINIC | Age: 64
Setting detail: THERAPIES SERIES
Discharge: HOME | End: 2019-05-29
Payer: MEDICARE

## 2019-05-29 PROCEDURE — 97033 APP MDLTY 1+IONTPHRSIS EA 15: CPT

## 2019-05-29 PROCEDURE — 97110 THERAPEUTIC EXERCISES: CPT

## 2019-05-29 NOTE — FLOWSHEET NOTE
[] [facility] &  Therapy  [address]  [phone]  F: [phone] [] [address]  [facility]   [address]  P: [phone]  F: [phone] [x] [address]  Therapy  [address] [facility]  P: [phone]  F: [phone] [] [address]  [facility]   [address]  [place]: [phone]  F: [phone]     Physical Therapy Daily Treatment Note    Date:  2019  Patient Name:  Benny Verde    :  1955  MRN: [id number]  Physician: Toi Bruce MD             Insurance: [facility]  Medical Diagnosis:   Lateral epicondylitis of right elbow     Rehab Codes: M77.11   Onset Date: 19 (Script)      Visit# / total visits: 10/18 Cancels/No Shows:     Subjective:    Pain:  [x] Yes  [] No Location: R Lat elbow Pain Rating: (0-10 scale) 1-2/10  Pain altered Tx:  [x] No  [] Yes  Action:  Comments:  Pt stated a little sore but subbed in for 10 minutes in a tennis match today.        Objective:  Modalities:   Treatment Location  Left      Right                          Position   R elbow []          [x]  [] Supine    [] Prone   [] Side lying  [x] Sitting          Treatment Modality    Hot Pack:    [] Large   [] Medium    [] Cervical     _____ min   2 Cold Pack   [x] Large   [] Medium    [] Cervical     __15___ min    Vasocompression    __° temp    ____pressure     _____ min    Electrical Stim:    [] IFC     [] MFAC      [] HVPC                          Protocol:_______  _____X_____min                          #Channels:  [] 1        [] 2       [] Other:    Ultrasound: ______W/cm2 x  ______min              [] 1MHz   [] 3Mhz                      Duty Factor: [] 100%  [] 50%   [] 20%                  Add: [] Lidex   [] Stim    [] Gel pad       Massage:    [] Soft Tissue   [] [facility] Friction                      [] Ice ____min   2 Iontophoresis:  IOGEL:  [] 1.5ml   [x] 2.5ml   [] 3.5ml                                            [] 1.0ml   [] 1.3ml                 Dosage:  [] 24 mA/min   [x] 40 mA/min  []  80 mA/min                Channels:   [] 1    [] 2                [x] 4mg/ml Dexamethasone Sodium Phosphate Dosage 24-80 mAmin                [] Acetic Acid       [] Other     [] Drug allergies reviewed    _______Initials           _______Date    Traction:   [] Prone   [] Supine   X _______min               [] Lumbar x ____lbs      [] Cervical x _____lbs               [] Continuous     [] Intermittent  -   On:_____x Off:_____   1 Other:  Exercise, manual MFR, TPR, hawk  to wrist extensors            Precautions:  Exercises:  Exercise Reps/ Time Weight/ Level Comments   airdyne  10m       Door stretch 10x10\"  Bent arms and Y shaped   Bicep stretch in door 10x10\"     Posterior capsule stretch 10x10\"     Wrist flex/ext stretch 3-5x  20 sec           Wrist UD, RD, supin, pro 20x 1 plate  Weighted bar   Peach puddy 3m     Wrist roll ups 10x 2.5#  PVC pipe with string                T-bands:      Extensions  20x [employer] of door   Triceps  20x [employer] of door   B ER 20x [place]  Middle of door    B horizontal abd 20x Orange  Middle of door   Bicep curls 20x Orange  Under feet         Other:    Specific Instructions for next treatment:    Treatment Charges: Mins Units   [x]  Modalities: IP/CP 10 1   [x]  Ther Exercise 30 2   []  Manual Therapy      []  Ther Activities     []  Aquatics     []  Vasocompression     []  Other     Total Treatment time 40 3       Assessment: [x] Progressing toward goals. [] No change. [x] Other:  Added in Airdyne for warm-up, multiple UE/upper body stretches added in today. T-band exercises added in today to create stability in shoulder girdle and promote better posture. Increased weight on wrist roll ups. Finished with ionto and ice at end.       STG:

## 2019-06-04 ENCOUNTER — TELEPHONE (OUTPATIENT)
Dept: PRIMARY CARE CLINIC | Age: 64
End: 2019-06-04

## 2019-06-04 ENCOUNTER — HOSPITAL ENCOUNTER (OUTPATIENT)
Dept: PHYSICAL THERAPY | Facility: CLINIC | Age: 64
Setting detail: THERAPIES SERIES
Discharge: HOME | End: 2019-06-04
Payer: MEDICARE

## 2019-06-04 PROCEDURE — 97033 APP MDLTY 1+IONTPHRSIS EA 15: CPT

## 2019-06-04 PROCEDURE — 97110 THERAPEUTIC EXERCISES: CPT

## 2019-06-04 NOTE — FLOWSHEET NOTE
Other:    Ultrasound: ______W/cm2 x  ______min              [] 1MHz   [] 3Mhz                      Duty Factor: [] 100%  [] 50%   [] 20%                  Add: [] Lidex   [] Stim    [] Gel pad       Massage:    [] Soft Tissue   [] Cross Friction                      [] Ice ____min   2 Iontophoresis:  IOGEL:  [] 1.5ml   [x] 2.5ml   [] 3.5ml                                            [] 1.0ml   [] 1.3ml                 Dosage:  [] 24 mA/min   [x] 40 mA/min  []  80 mA/min                Channels:   [] 1    [] 2                [x] 4mg/ml Dexamethasone Sodium Phosphate Dosage 24-80 mAmin                [] Acetic Acid       [] Other     [] Drug allergies reviewed    _______Initials           _______Date    Traction:   [] Prone   [] Supine   X _______min               [] Lumbar x ____lbs      [] Cervical x _____lbs               [] Continuous     [] Intermittent  -   On:_____x Off:_____   1 Other:  Exercise, manual MFR, TPR, hawk  to wrist extensors            Precautions:  Exercises:  Exercise Reps/ Time Weight/ Level Comments   airdyne  10m       Door stretch 10x10\"  Bent arms and Y shaped   Bicep stretch in door 10x10\"     Posterior capsule stretch 10x10\"     Wrist flex/ext stretch 3-5x  20 sec           Wrist UD, RD, supin, pro 20x 2 plate  Weighted bar   Orange puddy 3m     Wrist roll ups 10x 2.5#  PVC pipe with string                T-bands:      Extensions  20x Blueberry  Top of door   Triceps  20x Blueberry  Top of door   B ER 20x Blueberry  Middle of door    B horizontal abd 20x Blueberry  Middle of door   Bicep curls 20x Blueberry  Under feet         Other:    Specific Instructions for next treatment:    Treatment Charges: Mins Units   [x]  Modalities: IP/CP 10 1   [x]  Ther Exercise 30 2   []  Manual Therapy      []  Ther Activities     []  Aquatics     []  Vasocompression     []  Other     Total Treatment time 40 3       Assessment: [x] Progressing toward goals. [] No change.      [x] Other:  Skipped

## 2019-06-04 NOTE — TELEPHONE ENCOUNTER
Patient called requesting latest labs be printed and he will  later today. He had his 2nd in series pneumonia vaccine yesterday at [employer] in left arm but arm/shoulder is very sore but he will take some Advil and keep moving it.

## 2019-06-07 ENCOUNTER — HOSPITAL ENCOUNTER (OUTPATIENT)
Dept: PHYSICAL THERAPY | Facility: CLINIC | Age: 64
Setting detail: THERAPIES SERIES
Discharge: HOME | End: 2019-06-07
Payer: MEDICARE

## 2019-06-07 PROCEDURE — 97033 APP MDLTY 1+IONTPHRSIS EA 15: CPT

## 2019-06-07 PROCEDURE — 97110 THERAPEUTIC EXERCISES: CPT

## 2019-06-07 NOTE — FLOWSHEET NOTE
Gel pad       Massage:    [] Soft Tissue   [] Cross Friction                      [] Ice ____min   2 Iontophoresis:  IOGEL:  [] 1.5ml   [x] 2.5ml   [] 3.5ml                                            [] 1.0ml   [] 1.3ml                 Dosage:  [] 24 mA/min   [x] 40 mA/min  []  80 mA/min                Channels:   [] 1    [] 2                [x] 4mg/ml Dexamethasone Sodium Phosphate Dosage 24-80 mAmin                [] Acetic Acid       [] Other     [] Drug allergies reviewed    _______Initials           _______Date    Traction:   [] Prone   [] Supine   X _______min               [] Lumbar x ____lbs      [] Cervical x _____lbs               [] Continuous     [] Intermittent  -   On:_____x Off:_____   1 Other:  Exercise, manual MFR, TPR, hawk  to wrist extensors            Precautions:  Exercises:  Exercise Reps/ Time Weight/ Level Comments   airdyne  10m       Door stretch 10x10\"  Bent arms and Y shaped   Bicep stretch in door 10x10\"     Posterior capsule stretch 10x10\"     Wrist flex/ext stretch 3-5x  20 sec           Wrist UD, RD, supin, pro 20x 2 plate  Weighted bar   Orange puddy 3m     Wrist roll ups 10x 2.5#  PVC pipe with string                T-bands:      Extensions  20x Blueberry  Top of door   Triceps  20x Blueberry  Top of door   B ER 20x Blueberry  Middle of door    B horizontal abd 20x Blueberry  Middle of door   Bicep curls 20x Blueberry  Under feet         Other:    Specific Instructions for next treatment:    Treatment Charges: Mins Units   [x]  Modalities: IP/CP 10 1   [x]  Ther Exercise 30 2   []  Manual Therapy      []  Ther Activities     []  Aquatics     []  Vasocompression     []  Other     Total Treatment time 40 3    84#     Assessment: [x] Progressing toward goals. [] No change. [x] Other: Continued w/ POC listed above - no progressions made this date d/t pain and soreness from previous tennis match. Finished w/ ionto and CP to proximal forearm.  Pt reports decrease in discomfort, however still feels sore after treatment. STG: (to be met in 12 treatments)  1. ? Pain: 2-3/10 : MET  2. ? ROM: R wrist WNL : MET  3. ? Strength: R wrist ext grossly 5/5, incr  strength by 10lbs : 6/7/2019 - L  strength 84#, R  strength 94#  4. ? Function: Able to sleep, /open jar, lift and carry w/o difficulty : MET  5. Independent with Home Exercise Programs : MET  6. Demonstrate Knowledge of fall prevention : MET  LTG: (to be met in 18 treatments)  1. Return to playing tennis and golf w/o issue  2. Incr UEFS by 15% for improved function                 Patient goals: to be able to play tennis, golf again relatively pain free        Pt. Education:  [x] Yes  [] No  [x] Reviewed Prior HEP/Ed  Method of Education: [x] Verbal  [] Demo  [] Written  Comprehension of Education:  [x] Verbalizes understanding. [] Demonstrates understanding. [] Needs review. [] Demonstrates/verbalizes HEP/Ed previously given. Plan: [x] Continue per plan of care.    [] Other:      Time In: 4637            Time Out: 4685    Electronically signed by:  Augie Raymundo PTA

## 2019-06-11 ENCOUNTER — HOSPITAL ENCOUNTER (OUTPATIENT)
Dept: PHYSICAL THERAPY | Facility: CLINIC | Age: 64
Setting detail: THERAPIES SERIES
Discharge: HOME | End: 2019-06-11
Payer: MEDICARE

## 2019-06-11 PROCEDURE — 97110 THERAPEUTIC EXERCISES: CPT

## 2019-06-11 NOTE — FLOWSHEET NOTE
[] [facility] &  Therapy  [address]  [phone]  F: [phone] [] [address]  [place] [address]   [address]  P: [phone]  F: [phone] [x] [address]  Therapy  [address]  P: [phone]  F: [phone] [] [address]  [facility]   [address]  [place]: [phone]  F: [phone]     Physical Therapy Daily Treatment Note    Date:  2019  Patient Name:  Tish Gaona    :  1955  MRN: [id number]  Physician: Samaria Joaquin MD             Insurance: [facility]  Medical Diagnosis:   Lateral epicondylitis of right elbow     Rehab Codes: M77.11   Onset Date: 19 (Script)      Visit# / total visits:  Cancels/No Shows:     Subjective:    Pain:  [x] Yes  [] No Location: R Lat elbow Pain Rating: (0-10 scale) 1-2/10  Pain altered Tx:  [x] No  [] Yes  Action:  Comments:  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 A little ache following his rounds of tennis yesterday but felt fine during. Pt needing to complete treatment approximately 10 minutes early today.         Objective:  Modalities:    Treatment Location  Left      Right                          Position   R elbow []          [x]  [] Supine    [] Prone   [] Side lying  [x] Sitting          Treatment Modality    Hot Pack:    [] Large   [] Medium    [] Cervical     _____ min   2 Cold Pack   [x] Large   [] Medium    [] Cervical     __15___ min    Vasocompression    __° temp    ____pressure     _____ min    Electrical Stim:    [] IFC     [] MFAC      [] HVPC                          Protocol:_______  _____X_____min                          #Channels:  [] 1        [] 2       [] Other:    Ultrasound: ______W/cm2 x  ______min [] 1MHz   [] 3Mhz                      Duty Factor: [] 100%  [] 50%   [] 20%                  Add: [] Lidex   [] Stim    [] Gel pad       Massage:    [] Soft Tissue   [] Cross Friction                      [] Ice ____min   -- Iontophoresis:  IOGEL:  [] 1.5ml   [x] 2.5ml   [] 3.5ml                                            [] 1.0ml   [] 1.3ml                 Dosage:  [] 24 mA/min   [x] 40 mA/min  []  80 mA/min                Channels:   [] 1    [] 2                [x] 4mg/ml Dexamethasone Sodium Phosphate Dosage 24-80 mAmin                [] Acetic Acid       [] Other     [] Drug allergies reviewed    _______Initials           _______Date    Traction:   [] Prone   [] Supine   X _______min               [] Lumbar x ____lbs      [] Cervical x _____lbs               [] Continuous     [] Intermittent  -   On:_____x Off:_____   1 Other:  Exercise, manual MFR, TPR, hawk  to wrist extensors            Precautions:  Exercises:  Exercise Reps/ Time Weight/ Level Comments   airdyne  10m       Door stretch 10x10\"  Bent arms and Y shaped   Bicep stretch in door 10x10\"     Posterior capsule stretch 10x10\"     Wrist flex/ext stretch 3-5x  20 sec           Wrist UD, RD, supin, pro 20x 3 plate  Weighted bar   Orange puddy      Wrist roll ups 10x 2.5#  PVC pipe with string                T-bands:      Extensions  20x Blueberry  Top of door   Triceps  20x Blueberry  Top of door   B ER 20x Blueberry  Middle of door    B horizontal abd 20x Blueberry  Middle of door   Bicep curls 20x Blueberry  Under feet         Other:    Specific Instructions for next treatment:    Treatment Charges: Mins Units   [x]  Modalities: CP 10 nc   [x]  Ther Exercise 38 3   []  Manual Therapy      []  Ther Activities     []  Aquatics     []  Vasocompression     []  Other     Total Treatment time 48 3         Assessment: [x] Progressing toward goals. [] No change.      [x] Other: Continued with stretching and strengthening ex with good pt

## 2019-06-13 ENCOUNTER — HOSPITAL ENCOUNTER (OUTPATIENT)
Dept: PHYSICAL THERAPY | Facility: CLINIC | Age: 64
Setting detail: THERAPIES SERIES
Discharge: HOME | End: 2019-06-13
Payer: MEDICARE

## 2019-06-13 PROCEDURE — 97110 THERAPEUTIC EXERCISES: CPT

## 2019-06-13 NOTE — FLOWSHEET NOTE
[] [facility]) [facility] &  Therapy  [address]  [phone]  F: [phone] [] [address]  [employer] [address]   [address]  P: [phone]  F: [phone] [x] [address]  Therapy  [address]  P: [phone]  F: [phone] [] [address]  [facility]   [address]  [place]: [phone]  F: [phone]     Physical Therapy Daily Treatment Note    Date:  2019  Patient Name:  Nadira Espinoza    :  1955  MRN: [id number]  Physician: Oziel Khan MD             Insurance: [facility]  Medical Diagnosis:   Lateral epicondylitis of right elbow     Rehab Codes: M77.11   Onset Date: 19 (Script)      Visit# / total visits:  Cancels/No Shows:     Subjective:    Pain:  [x] Yes  [] No Location: R Lat elbow Pain Rating: (0-10 scale) 1-2/10  Pain altered Tx:  [x] No  [] Yes  Action:  Comments:  Pt stating he is a little sore in elbow today. Pt states he has been able to play some tennis lately with no real increase in pain. Pt feels pleased with progress and feels his strength has improved.          Objective:  Modalities:    Treatment Location  Left      Right                          Position   R elbow []          [x]  [] Supine    [] Prone   [] Side lying  [x] Sitting          Treatment Modality    Hot Pack:    [] Large   [] Medium    [] Cervical     _____ min   2 Cold Pack   [x] Large   [] Medium    [] Cervical     __15___ min    Vasocompression    __° temp    ____pressure     _____ min    Electrical Stim:    [] IFC     [] MFAC      [] HVPC                          Protocol:_______  _____X_____min                          #Channels:  [] 1        [] 2       [] Other:    Ultrasound: ______W/cm2 x  ______min              [] 1MHz   [] 3Mhz                      Duty Factor: []

## 2019-06-18 ENCOUNTER — HOSPITAL ENCOUNTER (OUTPATIENT)
Dept: PHYSICAL THERAPY | Facility: CLINIC | Age: 64
Setting detail: THERAPIES SERIES
Discharge: HOME | End: 2019-06-18
Payer: MEDICARE

## 2019-06-18 PROCEDURE — 97110 THERAPEUTIC EXERCISES: CPT

## 2019-06-18 NOTE — FLOWSHEET NOTE
Massage:    [] Soft Tissue   [] Cross Friction                      [] Ice ____min   -- Iontophoresis:  IOGEL:  [] 1.5ml   [x] 2.5ml   [] 3.5ml                                            [] 1.0ml   [] 1.3ml                 Dosage:  [] 24 mA/min   [x] 40 mA/min  []  80 mA/min                Channels:   [] 1    [] 2                [x] 4mg/ml Dexamethasone Sodium Phosphate Dosage 24-80 mAmin                [] Acetic Acid       [] Other     [] Drug allergies reviewed    _______Initials           _______Date    Traction:   [] Prone   [] Supine   X _______min               [] Lumbar x ____lbs      [] Cervical x _____lbs               [] Continuous     [] Intermittent  -   On:_____x Off:_____   1 Other:  Exercise, manual MFR, TPR, hawk  to wrist extensors            Precautions:  Exercises:  Exercise Reps/ Time Weight/ Level Comments   airdyne  10m       Door stretch 10x10\"  Bent arms and Y shaped   Bicep stretch in door 10x10\"     Posterior capsule stretch 10x10\"     Wrist flex/ext stretch 3-5x  20 sec           Wrist UD, RD, supin, pro 30x 3 plate  Weighted bar   Orange puddy      Wrist roll ups 10x 2.5#  PVC pipe with string    wrist flexion/extension 30x 2# Cue for eccentric control   Wrist maze 5x           T-bands:      Extensions  30x Blueberry  Top of door   Triceps  30x Blueberry  Top of door   B ER 30x Blueberry  Middle of door    B horizontal abd 30x Blueberry  Middle of door   Bicep curls 30x Blueberry  Under feet         Other:    Specific Instructions for next treatment:    Treatment Charges: Mins Units   [x]  Modalities: CP/HP 15 nc   [x]  Ther Exercise 40 3   []  Manual Therapy      []  Ther Activities     []  Aquatics     []  Vasocompression     []  Other     Total Treatment time 55 3         Assessment: [x] Progressing toward goals. [] No change. [x] Other:  Progressed pt with increasing reps for exercises this date, as well as adding in wrist maze with good tolerance.  No increase in pain noted in lower back or elbow this date. Ended treatment with HP to lower back and CP to elbow. STG: (to be met in 12 treatments)  1. ? Pain: 2-3/10 : MET  2. ? ROM: R wrist WNL : MET  3. ? Strength: R wrist ext grossly 5/5, incr  strength by 10lbs : 6/7/2019 - L  strength 84#, R  strength 94#  4. ? Function: Able to sleep, /open jar, lift and carry w/o difficulty : MET  5. Independent with Home Exercise Programs : MET  6. Demonstrate Knowledge of fall prevention : MET  LTG: (to be met in 18 treatments)  1. Return to playing tennis and golf w/o issue  2. Incr UEFS by 15% for improved function                 Patient goals: to be able to play tennis, golf again relatively pain free        Pt. Education:  [x] Yes  [] No  [x] Reviewed Prior HEP/Ed  Method of Education: [x] Verbal  [] Demo  [] Written  Comprehension of Education:  [x] Verbalizes understanding. [] Demonstrates understanding. [] Needs review. [] Demonstrates/verbalizes HEP/Ed previously given. Plan: [x] Continue per plan of care.    [] Other:      Time In: 09:00 am            Time Out: 10:05 am     Electronically signed by:  Stevie Cervantes PTA

## 2019-06-26 ENCOUNTER — HOSPITAL ENCOUNTER (OUTPATIENT)
Dept: PHYSICAL THERAPY | Facility: CLINIC | Age: 64
Setting detail: THERAPIES SERIES
Discharge: HOME | End: 2019-06-26
Payer: MEDICARE

## 2019-06-26 PROCEDURE — 97110 THERAPEUTIC EXERCISES: CPT

## 2019-06-26 NOTE — FLOWSHEET NOTE
Stim    [] Gel pad       Massage:    [] Soft Tissue   [] Cross Friction                      [] Ice ____min   -- Iontophoresis:  IOGEL:  [] 1.5ml   [x] 2.5ml   [] 3.5ml                                            [] 1.0ml   [] 1.3ml                 Dosage:  [] 24 mA/min   [x] 40 mA/min  []  80 mA/min                Channels:   [] 1    [] 2                [x] 4mg/ml Dexamethasone Sodium Phosphate Dosage 24-80 mAmin                [] Acetic Acid       [] Other     [] Drug allergies reviewed    _______Initials           _______Date    Traction:   [] Prone   [] Supine   X _______min               [] Lumbar x ____lbs      [] Cervical x _____lbs               [] Continuous     [] Intermittent  -   On:_____x Off:_____   1 Other:  Exercise, manual MFR, TPR, hawk  to wrist extensors            Precautions:  Exercises:  Exercise Reps/ Time Weight/ Level Comments   airdyne  10m       Door stretch 10x10\"  Bent arms and Y shaped   Bicep stretch in door 10x10\"     Posterior capsule stretch 10x10\"     Wrist flex/ext stretch 3x  20 sec           Wrist UD, RD, supin, pro 30x 3 plate  Weighted bar   Orange puddy      Wrist roll ups 10x 2.5#  PVC pipe with string    wrist flexion/extension 30x 3# Cue for eccentric control   Wrist maze 5x           T-bands:      Extensions  30x Blueberry  Top of door   Triceps  30x Blueberry  Top of door   B ER 30x Blueberry  Middle of door    B horizontal abd 30x Blueberry  Middle of door   Bicep curls 30x Blueberry  Under feet         Other:    Specific Instructions for next treatment:    Treatment Charges: Mins Units   [x]  Modalities: CP 12 nc   [x]  Ther Exercise 40 3   []  Manual Therapy      []  Ther Activities     []  Aquatics     []  Vasocompression     []  Other     Total Treatment time 52 3         Assessment: [x] Progressing toward goals. [] No change. [x] Other: Able to complete ex without any increases pain symptoms per pt just general muscular soreness.  Increased resistance with tband ex and increased weight with wrsit flexion/extension with good pt tolerance. Only requiring minor cueing for recall and technique. PT requesting ice post ex for soreness relief. Pt scheduled for remaining two visits per [facility] along with re-eval with primary PT as well. STG: (to be met in 12 treatments)  1. ? Pain: 2-3/10 : MET  2. ? ROM: R wrist WNL : MET  3. ? Strength: R wrist ext grossly 5/5, incr  strength by 10lbs : 6/7/2019 - L  strength 84#, R  strength 94#  4. ? Function: Able to sleep, /open jar, lift and carry w/o difficulty : MET  5. Independent with Home Exercise Programs : MET  6. Demonstrate Knowledge of fall prevention : MET  LTG: (to be met in 18 treatments)  1. Return to playing tennis and golf w/o issue  2. Incr UEFS by 15% for improved function                 Patient goals: to be able to play tennis, golf again relatively pain free        Pt. Education:  [x] Yes  [] No  [x] Reviewed Prior HEP/Ed  Method of Education: [x] Verbal  [] Demo  [] Written  Comprehension of Education:  [x] Verbalizes understanding. [] Demonstrates understanding. [] Needs review. [] Demonstrates/verbalizes HEP/Ed previously given. Plan: [x] Continue per plan of care.    [] Other:      Time In: 4:30 pm            Time Out: 5:25 pm     Electronically signed by:  mEily Madison PTA

## 2019-07-01 ENCOUNTER — HOSPITAL ENCOUNTER (OUTPATIENT)
Dept: PHYSICAL THERAPY | Facility: CLINIC | Age: 64
Setting detail: THERAPIES SERIES
Discharge: HOME | End: 2019-07-01
Payer: MEDICARE

## 2019-07-01 PROCEDURE — 97110 THERAPEUTIC EXERCISES: CPT

## 2019-07-01 NOTE — FLOWSHEET NOTE
[] Ice ____min   -- Iontophoresis:  IOGEL:  [] 1.5ml   [x] 2.5ml   [] 3.5ml                                            [] 1.0ml   [] 1.3ml                 Dosage:  [] 24 mA/min   [x] 40 mA/min  []  80 mA/min                Channels:   [] 1    [] 2                [x] 4mg/ml Dexamethasone Sodium Phosphate Dosage 24-80 mAmin                [] Acetic Acid       [] Other     [] Drug allergies reviewed    _______Initials           _______Date    Traction:   [] Prone   [] Supine   X _______min               [] Lumbar x ____lbs      [] Cervical x _____lbs               [] Continuous     [] Intermittent  -   On:_____x Off:_____   1 Other:  Exercise, manual MFR, TPR, hawk  to wrist extensors            Precautions:  Exercises:  Exercise Reps/ Time Weight/ Level Comments   airdyne  10m       Door stretch 10x10\"  Bent arms and Y shaped   Bicep stretch in door 10x10\"     Posterior capsule stretch 10x10\"     Wrist flex/ext stretch 3x  20 sec           Wrist UD, RD 30x 3 plate  Weighted bar   Wrist supin, pronation  30x wand    Wrist roll ups 10x 2.5#  PVC pipe with string    wrist flexion/extension 30x 3# Cue for eccentric control               T-bands:      Extensions  30x plum Top of door   Triceps  30x plum Top of door   B ER 30x plum    B horizontal abd 30x plum          Bicep curls 3 way 15x ea 5#     Tricep CC ext 15x ea  Palm up, palm down   Other:    Specific Instructions for next treatment:    Treatment Charges: Mins Units   [x]  Modalities: CP 12 nc   [x]  Ther Exercise 40 3   []  Manual Therapy      []  Ther Activities     []  Aquatics     []  Vasocompression     []  Other     Total Treatment time 52 3         Assessment: [x] Progressing toward goals. [] No change. [x] Other: Pt denied any increased pain with completion of ex per chart above. Increased weight with bicep curls and addition of cable column tricep ext for elbow strengthening. Pt still noted fatigue with wrist roll up ex.  Pt scheduled for final visit and re-eval with primary PT with anticipation to d/c to HEP. STG: (to be met in 12 treatments)  1. ? Pain: 2-3/10 : MET  2. ? ROM: R wrist WNL : MET  3. ? Strength: R wrist ext grossly 5/5, incr  strength by 10lbs : 6/7/2019 - L  strength 84#, R  strength 94#  4. ? Function: Able to sleep, /open jar, lift and carry w/o difficulty : MET  5. Independent with Home Exercise Programs : MET  6. Demonstrate Knowledge of fall prevention : MET  LTG: (to be met in 18 treatments)  1. Return to playing tennis and golf w/o issue  2. Incr UEFS by 15% for improved function                 Patient goals: to be able to play tennis, golf again relatively pain free        Pt. Education:  [x] Yes  [] No  [x] Reviewed Prior HEP/Ed  Method of Education: [x] Verbal  [] Demo  [] Written  Comprehension of Education:  [x] Verbalizes understanding. [] Demonstrates understanding. [] Needs review. [] Demonstrates/verbalizes HEP/Ed previously given. Plan: [x] Continue per plan of care.    [] Other:      Time In: 4:00 pm            Time Out: 4:55 pm     Electronically signed by:  Justin Martinez PTA

## 2019-07-08 ENCOUNTER — HOSPITAL ENCOUNTER (OUTPATIENT)
Dept: PHYSICAL THERAPY | Facility: CLINIC | Age: 64
Setting detail: THERAPIES SERIES
Discharge: HOME | End: 2019-07-08
Payer: MEDICARE

## 2019-07-08 PROCEDURE — 97110 THERAPEUTIC EXERCISES: CPT

## 2019-07-08 PROCEDURE — 97530 THERAPEUTIC ACTIVITIES: CPT

## 2019-07-08 NOTE — FLOWSHEET NOTE
[] Ice ____min   -- Iontophoresis:  IOGEL:  [] 1.5ml   [x] 2.5ml   [] 3.5ml                                            [] 1.0ml   [] 1.3ml                 Dosage:  [] 24 mA/min   [x] 40 mA/min  []  80 mA/min                Channels:   [] 1    [] 2                [x] 4mg/ml Dexamethasone Sodium Phosphate Dosage 24-80 mAmin                [] Acetic Acid       [] Other     [] Drug allergies reviewed    _______Initials           _______Date    Traction:   [] Prone   [] Supine   X _______min               [] Lumbar x ____lbs      [] Cervical x _____lbs               [] Continuous     [] Intermittent  -   On:_____x Off:_____   1 Other:  Exercise, manual MFR, TPR, hawk  to wrist extensors            Precautions:  Exercises:  Exercise Reps/ Time Weight/ Level Comments   airdyne  10m       Door stretch 10x10\"  Bent arms and Y shaped   Bicep stretch in door 10x10\"     Posterior capsule stretch 10x10\"     Wrist flex/ext stretch 3x  20 sec           Wrist UD, RD 30x 3 plate  Weighted bar   Wrist supin, pronation  30x wand    Wrist roll ups 10x 2.5#  PVC pipe with string    wrist flexion/extension 30x 3# Cue for eccentric control               T-bands:      Extensions  30x plum Top of door   Triceps  30x plum Top of door   B ER 30x plum    B horizontal abd 30x plum          Bicep curls 3 way 20x ea 5#     Tricep CC ext 15x ea 15# Palm up, palm down   Other:    Specific Instructions for next treatment:    Treatment Charges: Mins Units     Modalities: CP     [x]  Ther Exercise 30 2   []  Manual Therapy      []  Ther Activities     []  Aquatics     []  Vasocompression     []  Other     Total Treatment time 30 2         Assessment: [x] Progressing toward goals. [] No change. [x] Other: Continued with stretches and strengthening ex with good pt tolerance. Independent with ex, cueing for set up but able to complete with proper technique. Denied any increase in pain or soreness just general muscular fatigue.

## 2019-11-26 ENCOUNTER — OFFICE VISIT (OUTPATIENT)
Dept: PRIMARY CARE CLINIC | Age: 64
End: 2019-11-26
Payer: MEDICARE

## 2019-11-26 VITALS
OXYGEN SATURATION: 97 % | BODY MASS INDEX: 26.61 KG/M2 | WEIGHT: 175.6 LBS | SYSTOLIC BLOOD PRESSURE: 122 MMHG | HEIGHT: 68 IN | HEART RATE: 64 BPM | DIASTOLIC BLOOD PRESSURE: 88 MMHG

## 2019-11-26 DIAGNOSIS — E78.00 PURE HYPERCHOLESTEROLEMIA: Primary | Chronic | ICD-10-CM

## 2019-11-26 PROCEDURE — 99213 OFFICE O/P EST LOW 20 MIN: CPT | Performed by: FAMILY MEDICINE

## 2019-11-26 ASSESSMENT — ENCOUNTER SYMPTOMS
EYE REDNESS: 0
VOMITING: 0
NAUSEA: 0
DIARRHEA: 0
SORE THROAT: 0
SHORTNESS OF BREATH: 0
WHEEZING: 0
EYE DISCHARGE: 0
ABDOMINAL PAIN: 0
COUGH: 0
RHINORRHEA: 0

## 2019-12-04 ENCOUNTER — HOSPITAL ENCOUNTER (OUTPATIENT)
Age: 64
Discharge: HOME | End: 2019-12-04
Payer: MEDICARE

## 2019-12-04 DIAGNOSIS — E78.00 PURE HYPERCHOLESTEROLEMIA: Chronic | ICD-10-CM

## 2019-12-04 LAB
ALBUMIN SERPL-MCNC: 4.1 G/DL (ref 3.5–5.2)
ALBUMIN/GLOBULIN RATIO: 1.5 (ref 1–2.5)
ALP BLD-CCNC: 47 U/L (ref 40–129)
ALT SERPL-CCNC: 19 U/L (ref 5–41)
AST SERPL-CCNC: 17 U/L
BILIRUB SERPL-MCNC: 0.46 MG/DL (ref 0.3–1.2)
BILIRUBIN DIRECT: 0.11 MG/DL
BILIRUBIN, INDIRECT: 0.35 MG/DL (ref 0–1)
CHOLESTEROL, FASTING: 169 MG/DL
CHOLESTEROL/HDL RATIO: 3.3
GLOBULIN: NORMAL G/DL (ref 1.5–3.8)
HDLC SERPL-MCNC: 51 MG/DL
LDL CHOLESTEROL: 96 MG/DL (ref 0–130)
TOTAL PROTEIN: 6.8 G/DL (ref 6.4–8.3)
TRIGLYCERIDE, FASTING: 108 MG/DL
VLDLC SERPL CALC-MCNC: NORMAL MG/DL (ref 1–30)

## 2019-12-04 PROCEDURE — 80076 HEPATIC FUNCTION PANEL: CPT

## 2019-12-04 PROCEDURE — 80061 LIPID PANEL: CPT

## 2019-12-04 PROCEDURE — 36415 COLL VENOUS BLD VENIPUNCTURE: CPT

## 2020-01-16 ENCOUNTER — HOSPITAL ENCOUNTER (OUTPATIENT)
Age: 65
Discharge: HOME | End: 2020-01-16
Payer: MEDICARE

## 2020-01-16 LAB — PROSTATE SPECIFIC ANTIGEN: 0.79 UG/L

## 2020-01-16 PROCEDURE — G0103 PSA SCREENING: HCPCS

## 2020-01-16 PROCEDURE — 36415 COLL VENOUS BLD VENIPUNCTURE: CPT

## 2020-02-03 ENCOUNTER — OFFICE VISIT (OUTPATIENT)
Dept: PRIMARY CARE CLINIC | Age: 65
End: 2020-02-03
Payer: MEDICARE

## 2020-02-03 VITALS
SYSTOLIC BLOOD PRESSURE: 136 MMHG | WEIGHT: 178.6 LBS | OXYGEN SATURATION: 98 % | BODY MASS INDEX: 27.07 KG/M2 | HEART RATE: 69 BPM | RESPIRATION RATE: 16 BRPM | HEIGHT: 68 IN | DIASTOLIC BLOOD PRESSURE: 80 MMHG

## 2020-02-03 PROCEDURE — 99213 OFFICE O/P EST LOW 20 MIN: CPT | Performed by: FAMILY MEDICINE

## 2020-02-03 RX ORDER — PANTOPRAZOLE SODIUM 40 MG/1
40 TABLET, DELAYED RELEASE ORAL
Qty: 30 TABLET | Refills: 5 | Status: SHIPPED | OUTPATIENT
Start: 2020-02-03 | End: 2020-12-17 | Stop reason: SDUPTHER

## 2020-02-03 ASSESSMENT — ENCOUNTER SYMPTOMS
EYE DISCHARGE: 0
WHEEZING: 0
SORE THROAT: 0
VOMITING: 0
COUGH: 0
EYE REDNESS: 0
NAUSEA: 0
DIARRHEA: 0
SHORTNESS OF BREATH: 0
ABDOMINAL PAIN: 0
RHINORRHEA: 0

## 2020-02-03 ASSESSMENT — PATIENT HEALTH QUESTIONNAIRE - PHQ9
SUM OF ALL RESPONSES TO PHQ QUESTIONS 1-9: 0
SUM OF ALL RESPONSES TO PHQ QUESTIONS 1-9: 0
2. FEELING DOWN, DEPRESSED OR HOPELESS: 0
1. LITTLE INTEREST OR PLEASURE IN DOING THINGS: 0
SUM OF ALL RESPONSES TO PHQ9 QUESTIONS 1 & 2: 0

## 2020-02-03 NOTE — PROGRESS NOTES
by mouth every morning (before breakfast) 30 tablet 5    pravastatin (PRAVACHOL) 20 MG tablet Take 1 tablet by mouth daily 90 tablet 3    diclofenac sodium 1 % GEL Apply 2 g topically 4 times daily 6 Tube 3    timolol (TIMOPTIC) 0.5 % ophthalmic solution   1     No current facility-administered medications for this visit. Allergies   Allergen Reactions    Neosporin Original [Bacitracin-Neomycin-Polymyxin] Itching    Seasonal        Health Maintenance   Topic Date Due    Annual Wellness Visit (AWV)  04/29/2020    Lipid screen  12/04/2020    Colon cancer screen colonoscopy  12/19/2022    DTaP/Tdap/Td vaccine (2 - Td) 08/30/2026    Flu vaccine  Completed    Shingles Vaccine  Completed    Pneumococcal 65+ years Vaccine  Completed    Hepatitis C screen  Completed       Subjective:      Review of Systems   Constitutional: Negative for chills and fever. HENT: Negative for rhinorrhea and sore throat. Eyes: Negative for discharge and redness. Respiratory: Negative for cough, shortness of breath and wheezing. Cardiovascular: Negative for chest pain and palpitations. Gastrointestinal: Negative for abdominal pain, diarrhea, nausea and vomiting. Genitourinary: Negative for dysuria and frequency. Musculoskeletal: Negative for arthralgias and myalgias. Neurological: Negative for dizziness, light-headedness and headaches. Psychiatric/Behavioral: Negative for sleep disturbance. Objective:     /80   Pulse 69   Resp 16   Ht 5' 8\" (1.727 m)   Wt 178 lb 9.6 oz (81 kg)   SpO2 98%   BMI 27.16 kg/m²   Physical Exam  Vitals signs and nursing note reviewed. Constitutional:       General: He is not in acute distress. Appearance: He is well-developed. HENT:      Head: Normocephalic and atraumatic. Eyes:      General: No scleral icterus. Right eye: No discharge. Left eye: No discharge.       Conjunctiva/sclera: Conjunctivae normal.      Pupils: Pupils are equal, round, and reactive to light. Neck:      Thyroid: No thyromegaly. Trachea: No tracheal deviation. Cardiovascular:      Rate and Rhythm: Normal rate and regular rhythm. Heart sounds: Normal heart sounds. Comments: No carotid bruits  Pulmonary:      Effort: Pulmonary effort is normal. No respiratory distress. Breath sounds: Normal breath sounds. No wheezing. Musculoskeletal:      Comments: Right shoulder shows good range of motion. Some [facility]. Lymphadenopathy:      Cervical: No cervical adenopathy. Skin:     General: Skin is warm. Findings: No rash. Neurological:      Mental Status: He is alert and oriented to person, place, and time. Psychiatric:         Behavior: Behavior normal.         Thought Content: Thought content normal.         Assessment:       Diagnosis Orders   1. Gastroesophageal reflux disease without esophagitis  pantoprazole (PROTONIX) 40 MG tablet   2. Strain of right rotator cuff capsule, initial encounter  XR SHOULDER RIGHT (MIN 2 VIEWS)        Plan:    xray shoulder  Physical therapy eval and treat  Trial of protonix, may need egd if no better    Return in about 3 months (around 5/3/2020), or medicare wellness. Orders Placed This Encounter   Procedures    XR SHOULDER RIGHT (MIN 2 VIEWS)     Standing Status:   Future     Standing Expiration Date:   2/3/2021     Order Specific Question:   Reason for exam:     Answer:   right shoulder pain     Orders Placed This Encounter   Medications    pantoprazole (PROTONIX) 40 MG tablet     Sig: Take 1 tablet by mouth every morning (before breakfast)     Dispense:  30 tablet     Refill:  5       Patient given educationalmaterials - see patient instructions. Discussed use, benefit, and side effectsof prescribed medications. All patient questions answered. Pt voiced understanding. Reviewed health maintenance. Instructed to continue current medications, diet andexercise.   Patient agreed with

## 2020-02-04 ENCOUNTER — HOSPITAL ENCOUNTER (OUTPATIENT)
Age: 65
Discharge: HOME | End: 2020-02-06
Payer: MEDICARE

## 2020-02-04 ENCOUNTER — HOSPITAL ENCOUNTER (OUTPATIENT)
Dept: GENERAL RADIOLOGY | Age: 65
Discharge: HOME | End: 2020-02-06
Payer: MEDICARE

## 2020-02-04 PROCEDURE — 73030 X-RAY EXAM OF SHOULDER: CPT

## 2020-02-11 ENCOUNTER — HOSPITAL ENCOUNTER (OUTPATIENT)
Dept: PHYSICAL THERAPY | Facility: CLINIC | Age: 65
Setting detail: THERAPIES SERIES
Discharge: HOME | End: 2020-02-11
Payer: MEDICARE

## 2020-02-11 PROCEDURE — 97161 PT EVAL LOW COMPLEX 20 MIN: CPT

## 2020-02-11 PROCEDURE — 97140 MANUAL THERAPY 1/> REGIONS: CPT

## 2020-02-13 ENCOUNTER — HOSPITAL ENCOUNTER (OUTPATIENT)
Dept: PHYSICAL THERAPY | Facility: CLINIC | Age: 65
Setting detail: THERAPIES SERIES
Discharge: HOME | End: 2020-02-13
Payer: MEDICARE

## 2020-02-13 NOTE — FLOWSHEET NOTE
[] [address]  [address]    [phone]  F: [phone]   [] [address] [facility] [address]   [address]  P: [phone]  F: [phone]  [x] [address]  [address] [facility]  P: [phone]  F: [phone]  [] [address] [facility]   [address]   [place]: [phone]  F: [phone]   [] [address]  [place]: [phone]   F: [phone]     Physical Therapy Cancel/No Show note    Date: 2020  Patient: Eunice Scott  : 1955  MRN: [id number]    Cancels/No Shows to date:   For today's appointment patient:    [x]  Cancelled    [] Rescheduled appointment    [] No-show     Reason given by patient:    []  Patient ill    []  Conflicting appointment    [] No transportation      [] Conflict with work    [x] No reason given    [] Weather related    [] Other:      Comments:        [x] Next appointment was confirmed    Electronically signed by: Iqra Sahni

## 2020-02-19 ENCOUNTER — HOSPITAL ENCOUNTER (OUTPATIENT)
Dept: PHYSICAL THERAPY | Facility: CLINIC | Age: 65
Setting detail: THERAPIES SERIES
Discharge: HOME | End: 2020-02-19
Payer: MEDICARE

## 2020-02-19 PROCEDURE — 97110 THERAPEUTIC EXERCISES: CPT

## 2020-02-19 PROCEDURE — 97140 MANUAL THERAPY 1/> REGIONS: CPT

## 2020-02-19 NOTE — FLOWSHEET NOTE
[] [facility]) - [facility] &  Therapy  [address]  [phone]  F: [phone] [] [address]  [facility]   [address]  P: [phone]  F: [phone] [x] [address] &  Therapy  [address] [facility] [address]  P: [phone]  F: [phone] [] [address]  [facility]   [address]   [place]: [phone]  F: [phone]      Physical Therapy Daily Treatment Note    Date:  2020  Patient Name:  Adolfo Echevarria    :  1955  MRN: [id number]  Physician: Dr. Brandee Jaquez: [place] Medicare  Medical Diagnosis: R shoulder pain  Rehab Codes: M25.511  Onset Date: 19                            Next 's appt: 20  Visit# / total visits: 2; Cancels/No Shows: 1/0    Subjective: Pt arrives denying any pain at the moment, pain only present with certain movements or motions. Was a little sore following his previous visit but overall felt fine, has been using a tennis ball to work on his posterior shoulder. Pain:  [x] Yes  [] No Location: R shoulder Pain Rating: (0-10 scale) 4-5/10  Pain altered Tx:  [x] No  [] Yes  Action:  Comments:    Objective: Todays Treatment:  Modalities: Game Ready as needed  Precautions: standard  Exercises:  Exercise Reps/ Time Weight/ Level Comments    Lena CAMARENA  5'                  Doorway pec stretch 5x15\"     Supine wand 10x5\" ea  Flexion, ER 90/90             SL ER 3x10 2#    SL HAB 3x10 0#          Prone ITY 15x ea              Tband rows 2x15 plum    Tband IR 2x10 plum    Tband ER 2x10 bberry    Other:  Manual  1. IASTYM to R infraspinatus muscle belly  2.  DI to pec minor  3.   Gh jt mobs: distraction 2x1', 2x10 AP grade 3     Specific Instructions for next treatment: manual and start to add RTC strengthening        Treatment Charges: Mins Units   []  Modalities     []  Ther Exercise 40 2   [x]  Manual Therapy 10 1   []  Ther Activities     []  Aquatics     []  Vasocompression     []  Other     Total Treatment time 50 3       Assessment: [x] Progressing toward goals. [] No change. [] Other:  [x] Patient would continue to benefit from skilled physical therapy services in order to: decrease pain symptoms with ADLS and improve R shoulder stability. Pt verbalizing decreased R infraspinatus tenderness with Hypervolt and STM this date, pec minor still extremely tender. Instructed in prone and SL ex to initiate RTC strengthening, pt tolerated well. Muscular fatigue post ex but denied any increased pain. Will monitor pt's symptoms and progress as isacc. STG: (to be met in 6 treatments)  1. ? Pain:<3/10 in R shoulder  2. ? ROM:R=L in all directions  3. ? Function: increase UEFI to >70/80  4. Independent with Home Exercise Programs     LTG: (to be met in 12 treatments)  1. No pain in  R shoulder  2. Able to return to playing tennis with no issues, including overhead swings        Pt. Education:  [x] Yes  [] No  [] Reviewed Prior HEP/Ed  Method of Education: [x] Verbal  [x] Demo  [] Written  Comprehension of Education:  [x] Verbalizes understanding. [x] Demonstrates understanding. [] Needs review. [] Demonstrates/verbalizes HEP/Ed previously given. Plan: [x] Continue for 12 visits toward short and long term goals.   []       Time In: 2:03pm            Time Out: 3:00pm    Electronically signed by:  Siddhartha Howell PTA

## 2020-02-21 ENCOUNTER — HOSPITAL ENCOUNTER (OUTPATIENT)
Dept: PHYSICAL THERAPY | Facility: CLINIC | Age: 65
Setting detail: THERAPIES SERIES
Discharge: HOME | End: 2020-02-21
Payer: MEDICARE

## 2020-02-21 PROCEDURE — 97110 THERAPEUTIC EXERCISES: CPT

## 2020-02-21 NOTE — FLOWSHEET NOTE
Progressing toward goals. Pt presents with minimal change in symptoms, but achieved full flexion by the end of the treatment and expressed his pleasure in the direction and progress. No manual with STM due to lack of tenderness    [] No change. [] Other:  []     STG: (to be met in 6 treatments)  1. ? Pain:<3/10 in R shoulder  2. ? ROM:R=L in all directions  3. ? Function: increase UEFI to >70/80  4. Independent with Home Exercise Programs     LTG: (to be met in 12 treatments)  1. No pain in  R shoulder  2. Able to return to playing tennis with no issues, including overhead swings        Pt. Education:  [x] Yes  [] No  [x] Reviewed Prior HEP/Ed  Method of Education: [x] Verbal  [x] Demo  [x] Written  Comprehension of Education:  [x] Verbalizes understanding. [x] Demonstrates understanding. [] Needs review. [] Demonstrates/verbalizes HEP/Ed previously given. Plan: [x] Continue for 12 visits toward short and long term goals.   []       Time In: 1300           Time Out: 3292    Electronically signed by:  Angelia Caro, PT

## 2020-02-25 ENCOUNTER — HOSPITAL ENCOUNTER (OUTPATIENT)
Dept: PHYSICAL THERAPY | Facility: CLINIC | Age: 65
Setting detail: THERAPIES SERIES
Discharge: HOME | End: 2020-02-25
Payer: MEDICARE

## 2020-02-25 PROCEDURE — 97110 THERAPEUTIC EXERCISES: CPT

## 2020-02-25 NOTE — FLOWSHEET NOTE
[] [facility]) [facility] &  Therapy  [address]  [phone]  F: [phone] [] [address]  [place] [address]   [address]  P: [phone]  F: [phone] [x] [address]  Therapy  [address]  P: [phone]  F: [phone] [] [address]  [facility]   [address]   [place]: [phone]  F: [phone]      Physical Therapy Daily Treatment Note    Date:  2020  Patient Name:  Noah Rivero    :  1955  MRN: [id number]  Physician: Dr. Lainey Fatima: [place] Medicare Progress note for Medicare patient due at visit 10  Medical Diagnosis: R shoulder pain  Rehab Codes: M25.511  Onset Date: 19                            Next 's appt: 20  Visit# / total visits:     Cancels/No Shows: 1/0    Subjective:  Pain:  [x] Yes  [] No Location: R shoulder Pain Rating: (0-10 scale) 6-7/10 worst  Pain altered Tx:  [x] No  [] Yes  Action:  Comments: Pt played multiple rounds of golf this past weekend plus tennis yesterday, may have over worked his shoulder but pain just with certain movements or hits. Does feel pain intensity has decreased some since starting PT. Objective:     Todays Treatment:  Modalities: Game Ready as needed  Precautions: standard  Exercises:  Exercise Reps/ Time Weight/ Level Comments   UBE  5'                  Doorway pec stretch 5x15\"     Supine wand 10x5\" ea  Flexion, ER 90/90    Pulleys 5'       SL ER 3x10 2#    SL HAB 3x10 1#    SL Abd 3x10 1#          Prone ITY 2x15  2 lbs  Y 2x10; IT 2x15   Prone ER 90/90 2x10  0#    Tband scaption 3x10 orange    Tband rows 2x15 plum    Tband ER 3x10-15 bberry    Other:     Specific Instructions for next treatment: manual and start to add RTC strengthening        Treatment Charges: Mins Units   []

## 2020-02-27 ENCOUNTER — HOSPITAL ENCOUNTER (OUTPATIENT)
Dept: PHYSICAL THERAPY | Facility: CLINIC | Age: 65
Setting detail: THERAPIES SERIES
Discharge: HOME | End: 2020-02-27
Payer: MEDICARE

## 2020-02-27 PROCEDURE — 97110 THERAPEUTIC EXERCISES: CPT

## 2020-02-27 NOTE — FLOWSHEET NOTE
[] [facility]) - [facility] &  Therapy  [address]  [phone]  F: [phone] [] [address]  [facility]   [address]  P: [phone]  F: [phone] [x] [address] &  Therapy  [address] [facility]  P: [phone]  F: [phone] [] [address]  [facility]   [address]   [place]: [phone]  F: [phone]      Physical Therapy Daily Treatment Note    Date:  2020  Patient Name:  Carmen Bartholomew    :  1955  MRN: [id number]  Physician: Dr. Stephen Pimentel: [employer]veen [employer] Medicare Progress note for Medicare patient due at visit 10  Medical Diagnosis: R shoulder pain  Rehab Codes: M25.511  Onset Date: 19                            Next 's appt: 20  Visit# / total visits:     Cancels/No Shows: 1/0    Subjective:  Pain:  [x] Yes  [] No Location: R shoulder Pain Rating: (0-10 scale) 5/10 with movement  Pain altered Tx:  [x] No  [] Yes  Action:  Comments: Pt arrives warmed up and ready to go after playing pickle ball. Pt reports minimal pain/soreness at rest and increased pain with movement. Objective: Todays Treatment:  Modalities: Game Ready as needed  Precautions: standard  Exercises:  Exercise Reps/ Time Weight/ Level Comments   UBE  5'                  Doorway pec stretch 5x15\"     Supine wand 10x5\" ea  Flexion, ER 90/90    Pulleys 5' 5\" hold    5\" hold at end range   SL ER 3x10 2#    SL HAB 3x10 1#    SL Abd 3x10 1#          Prone ITY 2x15  2 lbs  Y 2x10; IT 2x15   Prone ER 90/90 2x10  0#    Tband scaption 3x10 orange Not today   Tband rows 2x15 plum    Tband mid rows 2x10 bberry    Tband ER 3x10 bberry    Other:     Specific Instructions for next treatment: Evaluate STG's on 6th visit. Manual and start to add RTC strengthening.          Treatment Charges: Mins Units   []  Modalities     [x]  Ther Exercise 55 4   []  Manual Therapy     []  Ther Activities     []  Aquatics     []  Vasocompression     []  Other     Total Treatment time 55 4       Assessment: [x] Progressing toward goals. Initiated treatment on UBE and proceeded with pec stretch to increase flexibility. Cont with pulleys, 5\" hold at end range with good stretch reported per pt. Pt notes 3-4/10 in R shoulder with supine wand flexion. Pt with good tolerance to exercise program at this date. Added Tband mid rows to further increase UE strength. Vc's to ensure proper technique with Tband and mat exercises. [] No change. [] Other:  []     STG: (to be met in 6 treatments)  1. ? Pain:<3/10 in R shoulder  2. ? ROM:R=L in all directions  3. ? Function: increase UEFI to >70/80  4. Independent with Home Exercise Programs     LTG: (to be met in 12 treatments)  1. No pain in  R shoulder  2. Able to return to playing tennis with no issues, including overhead swings        Pt. Education:  [x] Yes  [] No  [] Reviewed Prior HEP/Ed Pt educated on the importance of completing HEP and applying ice/heat as needed to reduce pain. Method of Education: [x] Verbal  [] Demo  [] Written  Comprehension of Education:  [x] Verbalizes understanding. [] Demonstrates understanding. [] Needs review. [] Demonstrates/verbalizes HEP/Ed previously given. Plan: [x] Continue for 12 visits toward short and long term goals.   []       Time In: 3:00 pm          Time Out: 4:00 pm    Electronically signed by:  Elton Townsend PTA

## 2020-03-03 ENCOUNTER — HOSPITAL ENCOUNTER (OUTPATIENT)
Dept: GENERAL RADIOLOGY | Age: 65
Discharge: HOME | End: 2020-03-05
Payer: MEDICARE

## 2020-03-03 ENCOUNTER — HOSPITAL ENCOUNTER (OUTPATIENT)
Dept: PHYSICAL THERAPY | Facility: CLINIC | Age: 65
Setting detail: THERAPIES SERIES
Discharge: HOME | End: 2020-03-03
Payer: MEDICARE

## 2020-03-03 ENCOUNTER — HOSPITAL ENCOUNTER (OUTPATIENT)
Dept: PHYSICAL THERAPY | Facility: CLINIC | Age: 65
Setting detail: THERAPIES SERIES
End: 2020-03-03
Payer: MEDICARE

## 2020-03-03 ENCOUNTER — HOSPITAL ENCOUNTER (OUTPATIENT)
Age: 65
Discharge: HOME | End: 2020-03-05
Payer: MEDICARE

## 2020-03-03 PROCEDURE — 74018 RADEX ABDOMEN 1 VIEW: CPT

## 2020-03-03 PROCEDURE — 97110 THERAPEUTIC EXERCISES: CPT

## 2020-03-03 NOTE — FLOWSHEET NOTE
[] [facility]) - [facility] &  Therapy  [address]  [phone]  F: [phone] [] [address]  [facility]   [address]  P: [phone]  F: [phone] [x] [address] &  Therapy  [address]  P: [phone]  F: [phone] [] [address]  [facility]   [address]   [place]: [phone]  F: [phone]      Physical Therapy Daily Treatment Note    Date:  3/3/2020  Patient Name:  Carmen Bartholomew    :  1955  MRN: [id number]  Physician: Dr. Stephen Pimentel: [place] Medicare Progress note for Medicare patient due at visit 10  Medical Diagnosis: R shoulder pain  Rehab Codes: M25.511  Onset Date: 19                            Next 's appt: 20  Visit# / total visits:     Cancels/No Shows: 1/0    Subjective:  Pain:  [] Yes  [x] No Location: R shoulder Pain Rating: (0-10 scale) 5/10 with movement  Pain altered Tx:  [x] No  [] Yes  Action:  Comments: Pt arrives warmed up after playing tennis. Patient states over all he feels better after therapy but pain comes back when playing tennis, in the mornings, and over head movements. Patient also states he needs to improve his form hitting tennis ball to decrease forces on right shoulder and elbow. Objective:     Todays Treatment:  Modalities: Game Ready as needed  Precautions: standard  Exercises:  Exercise Reps/ Time Weight/ Level Comments   UBE  5'     Not today             Doorway pec stretch 5x20\"     Supine wand 10x5\" ea  Flexion, ER 90/90    Pulleys 5' 5\" hold    5\" hold at end range   SL ER 1x15  1x10 2#    SL HAB 2x10 2# Increased weight 3/2/20   SL Abd 2x10 2# Increased weight 3/2/20         Prone ITY 2x15  2 lbs    Prone ER 90/90 2x10  0#    Tband scaption 3x10 orange    Tband rows 2x15 plum    Tband mid rows

## 2020-03-05 ENCOUNTER — HOSPITAL ENCOUNTER (OUTPATIENT)
Dept: PHYSICAL THERAPY | Facility: CLINIC | Age: 65
Setting detail: THERAPIES SERIES
Discharge: HOME | End: 2020-03-05
Payer: MEDICARE

## 2020-03-05 PROCEDURE — 97110 THERAPEUTIC EXERCISES: CPT

## 2020-03-05 NOTE — FLOWSHEET NOTE
[] [facility]) [facility] &  Therapy  [address]  [phone]  F: [phone] [] [address]  [facility]   [address]  P: [phone]  F: [phone] [x] [address]  Therapy  [address]  P: [phone]  F: [phone] [] [address]  [facility]   [address]   [place]: [phone]  F: [phone]      Physical Therapy Daily Treatment Note    Date:  3/5/2020  Patient Name:  Augie Kinsey    :  1955  MRN: [id number]  Physician: Dr. Daniel Pereira: Jerod Fonseca Medicare Progress note for Medicare patient due at visit 10  Medical Diagnosis: R shoulder pain  Rehab Codes: M25.511  Onset Date: 19                            Next 's appt: 20  Visit# / total visits:     Cancels/No Shows: 1/0    Subjective:  Pain:  [] Yes  [x] No Location: R shoulder Pain Rating: (0-10 scale) 5/10 with movement  Pain altered Tx:  [x] No  [] Yes  Action:  Comments: Patient reports pain is not persistent and usually occurs with overhead movement and when playing tennis. Objective: Todays Treatment:  Modalities: Game Ready as needed  Precautions: standard  Exercises:  Exercise Reps/ Time Weight/ Level Comments   UBE  5'                  Doorway pec stretch 5x20\"     Supine wand 10x5\" ea  Flexion, ER 90/90    Pulleys 5' 5\" hold    5\" hold at end range   SL ER 1x15  1x10 2#    SL HAB 2x10 2# Increased weight 3/2/20   SL Abd 2x10 2# Increased weight 3/2/20         Prone ITY 2x15  2 lbs    Prone ER 90/90 2x10  0#    Tband ITY 2x10 orange    Tband rows 2x15 plum    Tband mid rows 2x15 bberry    Tband ER 2x15 bberry    Other:     Specific Instructions for next treatment:  Manual prn for symptom management and advancing  RTC strengthening.          Treatment Charges: Mins Units   [] Modalities     [x]  Ther Exercise 40 3   []  Manual Therapy     []  Ther Activities     []  Aquatics     []  Vasocompression     []  Other      Total Treatment time 40 3       Assessment: [x] Progressing toward goals. Continued program with good tolerance and the addition of flexion and horizontal ABD with Theraband. Patient requires minimal verbal cueing during standing ex as patient follows instruction and demonstrates improved technique. Patient finds ER in S/L to be fatiguing and received tactile cueing to correct shoulder compensation with good follow through. Patient does not complain of any increased pain with exercise just notes fatigued muscles. Encouraged patient to ice at home for symptom relief after sport activity. [] No change. [] Other:    STG: (to be met in 6 treatments)  1. ? Pain:<3/10 in R shoulder  3/2/20- pain at worst 6-7/10  ? ROM:R=L in all directions 3/2/20-    Shoulder flexion Right: 157 degrees (147 degrees on eval) Left: 160 degrees (157 degrees on eval)    ER @ 0 45 in supine Right: 65 degrees  Left: 78 degrees    IR ( R vs L ) : -2\" (-2\" on eval)  2. ? Function: increase UEFI to >70/80 3/2/20- 65/80 (60/80 on eval)  3. Independent with Home Exercise Programs 3/2/20- patient reports inconsistency with completing exercises on own     LTG: (to be met in 12 treatments)  1. No pain in  R shoulder  2. Able to return to playing tennis with no issues, including overhead swings        Pt. Education:  [x] Yes  [] No  [x] Reviewed Prior HEP/Ed   Method of Education: [x] Verbal  [] Demo  [] Written  Comprehension of Education:  [x] Verbalizes understanding. [] Demonstrates understanding. [] Needs review. [] Demonstrates/verbalizes HEP/Ed previously given. Plan: [x] Continue for 6 visits toward short and long term goals.   []       Time In: 12:10 pm          Time Out: 1:00pm    Electronically signed by:  Chelsie Murillo PTA

## 2020-03-09 ENCOUNTER — HOSPITAL ENCOUNTER (OUTPATIENT)
Dept: PHYSICAL THERAPY | Facility: CLINIC | Age: 65
Setting detail: THERAPIES SERIES
Discharge: HOME | End: 2020-03-09
Payer: MEDICARE

## 2020-03-09 PROCEDURE — 97110 THERAPEUTIC EXERCISES: CPT

## 2020-03-09 NOTE — FLOWSHEET NOTE
[] [facility]) - [facility] &  Therapy  [address]  [phone]  F: [phone] [] [address]  [place] [address]   [address]  P: [phone]  F: [phone] [x] [address] &  Therapy  [address]  P: [phone]  F: [phone] [] [address]  [facility]   [address]   [place]: [phone]  F: [phone]      Physical Therapy Daily Treatment Note    Date:  3/9/2020  Patient Name:  Shakila Mayer    :  1955  MRN: [id number]  Physician: Dr. Papo Tovar: [employer] Medicare Progress note for Medicare patient due at visit 10  Medical Diagnosis: R shoulder pain  Rehab Codes: M25.511  Onset Date: 19                            Next 's appt: 20  Visit# / total visits:     Cancels/No Shows: 1/0    Subjective:  Pain:  [] Yes  [x] No Location: R shoulder Pain Rating: (0-10 scale) 2-3/10 with movement  Pain altered Tx:  [x] No  [] Yes  Action:  Comments:Golfed yesterday, played tennis today and felt pretty good. States he has tweaked his tennis swing a little which has helped. Less pain and more just soreness. Objective:     Todays Treatment:  Modalities: Game Ready as needed  Precautions: standard  Exercises:  Exercise Reps/ Time Weight/ Level Comments   UBE  5'                  Doorway pec stretch 5x20\"     Supine wand 10x5\" ea  Flexion, ER 90/90    Pulleys 5' 5\" hold    5\" hold at end range   SL ER 1x15  1x10 2#    SL HAB 2x10 2# Increased weight 3//20   SL Abd 2x10 2# Increased weight 3/2/20         Prone ITY 2x15  2 lbs    Prone ER 90/90 2x10  1#    Tband ITY 2x10 orange    Tband rows 2x15 plum    Tband mid rows 2x15 plum    Tband ER 2x15 plum    HAB on wall 2x10 bberry    Ball on wall 2x10 ea 1.5 All directions          Other:     Specific Instructions for next treatment:  Manual prn for symptom management and advancing  RTC strengthening. Treatment Charges: Mins Units   []  Modalities     [x]  Ther Exercise 40 3   []  Manual Therapy     []  Ther Activities     []  Aquatics     []  Vasocompression     []  Other      Total Treatment time 40 3       Assessment: [x] Progressing toward goals. Progressed resistance and weight to continue to strength R shoulder and RTC for stabilization with movement. Also added HAB on wall and ball on wall, pt tolerated well. Denied pain with mat ex just muscular fatigue. Minor cueing needed with prone ex to initiate movement with scapular stabilization first. Will continue to progress pt as isacc. [] No change. [] Other:    STG: (to be met in 6 treatments)  1. ? Pain:<3/10 in R shoulder  3/2/20- pain at worst 6-7/10  ? ROM:R=L in all directions 3/2/20-    Shoulder flexion Right: 157 degrees (147 degrees on eval) Left: 160 degrees (157 degrees on eval)    ER @ 0 45 in supine Right: 65 degrees  Left: 78 degrees    IR ( R vs L ) : -2\" (-2\" on eval)  2. ? Function: increase UEFI to >70/80 3/2/20- 65/80 (60/80 on eval)  3. Independent with Home Exercise Programs 3/2/20- patient reports inconsistency with completing exercises on own     LTG: (to be met in 12 treatments)  1. No pain in  R shoulder  2. Able to return to playing tennis with no issues, including overhead swings        Pt. Education:  [x] Yes  [] No  [x] Reviewed Prior HEP/Ed   Method of Education: [x] Verbal  [] Demo  [] Written  Comprehension of Education:  [x] Verbalizes understanding. [] Demonstrates understanding. [] Needs review. [] Demonstrates/verbalizes HEP/Ed previously given. Plan: [x] Continue for 6 visits toward short and long term goals.   []       Time In: 2:33 pm          Time Out: 3:25pm    Electronically signed by:  Chirag Mclain PTA

## 2020-03-11 ENCOUNTER — HOSPITAL ENCOUNTER (OUTPATIENT)
Dept: PHYSICAL THERAPY | Facility: CLINIC | Age: 65
Setting detail: THERAPIES SERIES
Discharge: HOME | End: 2020-03-11
Payer: MEDICARE

## 2020-03-11 PROCEDURE — 97110 THERAPEUTIC EXERCISES: CPT

## 2020-03-11 NOTE — FLOWSHEET NOTE
Other:     Specific Instructions for next treatment:          Treatment Charges: Mins Units   []  Modalities     [x]  Ther Exercise 45 3   []  Manual Therapy     []  Ther Activities     []  Aquatics     []  Vasocompression     []  Other      Total Treatment time 45 3       Assessment: [x] Progressing toward goals. Pt demonstrates improved flexion ROM with pulleys, reporting good stretch still. Continued with R shoulder and scapular strengthening ex with good pt tolerance. Still requiring some cueing to ensure proper technique but good follow through. Added PB on wall with RS to challenge stability, no pain but fatigued quickly. Also fatigued quickly with SL ex but again denied pain. [] No change. [] Other:    STG: (to be met in 6 treatments)  1. ? Pain:<3/10 in R shoulder  3/2/20- pain at worst 6-7/10  ? ROM:R=L in all directions 3/2/20-    Shoulder flexion Right: 157 degrees (147 degrees on eval) Left: 160 degrees (157 degrees on eval)    ER @ 0 45 in supine Right: 65 degrees  Left: 78 degrees    IR ( R vs L ) : -2\" (-2\" on eval)  2. ? Function: increase UEFI to >70/80 3/2/20- 65/80 (60/80 on eval)  3. Independent with Home Exercise Programs 3/2/20- patient reports inconsistency with completing exercises on own     LTG: (to be met in 12 treatments)  1. No pain in  R shoulder  2. Able to return to playing tennis with no issues, including overhead swings        Pt. Education:  [x] Yes  [] No  [x] Reviewed Prior HEP/Ed   Method of Education: [x] Verbal  [] Demo  [] Written  Comprehension of Education:  [x] Verbalizes understanding. [] Demonstrates understanding. [] Needs review. [] Demonstrates/verbalizes HEP/Ed previously given. Plan: [x] Continue for 3 visits toward short and long term goals.   []       Time In: 2:04 pm          Time Out: 2:55pm    Electronically signed by:  Mac Funk PTA

## 2020-03-16 ENCOUNTER — HOSPITAL ENCOUNTER (OUTPATIENT)
Dept: PHYSICAL THERAPY | Facility: CLINIC | Age: 65
Setting detail: THERAPIES SERIES
Discharge: HOME | End: 2020-03-16
Payer: MEDICARE

## 2020-03-16 NOTE — FLOWSHEET NOTE
[] [address]  [address]    [phone]  F: [phone]   [] [address] [facility] [address]   [address]  P: [phone]  F: [phone]  [x] [address]  [address] [facility]  P: [phone]  F: [phone]  [] [address] [facility]   [address]   [place]: [phone]  F: [phone]   [] [place] [address] [facility] [address]  [place]: [phone]   F: [phone]     Physical Therapy Cancel/No Show note    Date: 3/16/2020  Patient: Nano Adamson  : 1955  MRN: [id number]    Cancels/No Shows to date:   For today's appointment patient:    [x]  Cancelled    [] Rescheduled appointment    [] No-show     Reason given by patient:    []  Patient ill    []  Conflicting appointment    [] No transportation      [] Conflict with work    [] No reason given    [] Weather related    [] Other:      Comments:        [x] Next appointment was confirmed    Electronically signed by: Sally Givens

## 2020-03-18 ENCOUNTER — APPOINTMENT (OUTPATIENT)
Dept: PHYSICAL THERAPY | Facility: CLINIC | Age: 65
End: 2020-03-18
Payer: MEDICARE

## 2020-03-31 RX ORDER — PRAVASTATIN SODIUM 20 MG
20 TABLET ORAL DAILY
Qty: 90 TABLET | Refills: 3 | Status: SHIPPED | OUTPATIENT
Start: 2020-03-31 | End: 2021-03-27 | Stop reason: SDUPTHER

## 2020-04-13 ENCOUNTER — E-VISIT (OUTPATIENT)
Dept: PRIMARY CARE CLINIC | Age: 65
End: 2020-04-13
Payer: MEDICARE

## 2020-04-13 PROCEDURE — 99421 OL DIG E/M SVC 5-10 MIN: CPT | Performed by: NURSE PRACTITIONER

## 2020-04-13 RX ORDER — DOXYCYCLINE 100 MG/1
100 CAPSULE ORAL 2 TIMES DAILY
Qty: 14 CAPSULE | Refills: 0 | Status: SHIPPED | OUTPATIENT
Start: 2020-04-13 | End: 2020-04-20

## 2020-06-08 ENCOUNTER — OFFICE VISIT (OUTPATIENT)
Dept: PRIMARY CARE CLINIC | Age: 65
End: 2020-06-08
Payer: MEDICARE

## 2020-06-08 VITALS
TEMPERATURE: 98 F | WEIGHT: 172.2 LBS | HEART RATE: 54 BPM | HEIGHT: 68 IN | DIASTOLIC BLOOD PRESSURE: 74 MMHG | SYSTOLIC BLOOD PRESSURE: 118 MMHG | BODY MASS INDEX: 26.1 KG/M2 | OXYGEN SATURATION: 98 %

## 2020-06-08 PROCEDURE — G0439 PPPS, SUBSEQ VISIT: HCPCS | Performed by: FAMILY MEDICINE

## 2020-06-08 ASSESSMENT — PATIENT HEALTH QUESTIONNAIRE - PHQ9
SUM OF ALL RESPONSES TO PHQ QUESTIONS 1-9: 0
SUM OF ALL RESPONSES TO PHQ QUESTIONS 1-9: 0

## 2020-06-08 NOTE — PROGRESS NOTES
Ped/Adol (Engerix-B, Recombivax HB) 11/29/1995, 01/04/1996, 06/04/1996    Influenza Vaccine, unspecified formulation 09/01/2017    Influenza Virus Vaccine 10/27/2012, 10/14/2014    Influenza, Triv, inactivated, subunit, adjuvanted, IM (Fluad 65 yrs and older) 09/11/2018, 09/16/2019    Pneumococcal Conjugate 13-valent ([id number]) 05/22/2018    Pneumococcal Polysaccharide ([id number]) 06/03/2019    Tdap (Boostrix, Adacel) 08/30/2016    Zoster Live (Zostavax) 10/12/2016    Zoster Recombinant (Shingrix) 05/22/2018, 11/06/2018        Health Maintenance   Topic Date Due    Annual Wellness Visit (AWV)  05/29/2019    Lipid screen  12/04/2020    Colon cancer screen colonoscopy  12/19/2022    DTaP/Tdap/Td vaccine (2 - Td) 08/30/2026    Flu vaccine  Completed    Shingles Vaccine  Completed    Pneumococcal 65+ years Vaccine  Completed    Hepatitis C screen  Completed    Hepatitis A vaccine  Aged Out    Hepatitis B vaccine  Aged Out    Hib vaccine  Aged Out    Meningococcal (ACWY) vaccine  Aged Out     Recommendations for [facility] Due: see orders and patient instructions/AVS.  . Recommended screening schedule for the next 5-10 years is provided to the patient in written form: see Patient Ludy Herring was seen today for medicare awv. Diagnoses and all orders for this visit:    Routine general medical examination at a health care facility              Anahy Patel is a 77 y.o. male being evaluated by a Virtual Visit (video and audio) encounter to address concerns as mentioned above. A caregiver was present when appropriate. Due to this being a TeleHealth encounter (During IAZXQ-52 public health emergency), evaluation of the following organ systems was limited: Vitals/Constitutional/EENT/Resp/CV/GI//MS/Neuro/Skin/Heme-Lymph-Imm.   Pursuant to the emergency declaration under the [facility], 1135 waiver authority and the Coronavirus

## 2020-12-09 ENCOUNTER — TELEPHONE (OUTPATIENT)
Dept: PRIMARY CARE CLINIC | Age: 65
End: 2020-12-09

## 2020-12-09 NOTE — TELEPHONE ENCOUNTER
Patient is requesting an order for routine blood work and would like it to post to Anthony so that he can print it out. Patient would like a call and can be reached at  [phone]. Okay to leave a message.

## 2020-12-17 ENCOUNTER — HOSPITAL ENCOUNTER (OUTPATIENT)
Age: 65
Discharge: HOME | End: 2020-12-17
Payer: MEDICARE

## 2020-12-17 LAB
ALBUMIN SERPL-MCNC: 4.3 G/DL (ref 3.5–5.2)
ALBUMIN/GLOBULIN RATIO: 1.7 (ref 1–2.5)
ALP BLD-CCNC: 49 U/L (ref 40–129)
ALT SERPL-CCNC: 20 U/L (ref 5–41)
ANION GAP SERPL CALCULATED.3IONS-SCNC: 12 MMOL/L (ref 9–17)
AST SERPL-CCNC: 18 U/L
BILIRUB SERPL-MCNC: 0.55 MG/DL (ref 0.3–1.2)
BILIRUBIN DIRECT: 0.12 MG/DL
BILIRUBIN, INDIRECT: 0.43 MG/DL (ref 0–1)
BUN BLDV-MCNC: 14 MG/DL (ref 8–23)
BUN/CREAT BLD: ABNORMAL (ref 9–20)
CALCIUM SERPL-MCNC: 9.3 MG/DL (ref 8.6–10.4)
CHLORIDE BLD-SCNC: 103 MMOL/L (ref 98–107)
CHOLESTEROL, FASTING: 186 MG/DL
CHOLESTEROL/HDL RATIO: 3.4
CO2: 25 MMOL/L (ref 20–31)
CREAT SERPL-MCNC: 0.94 MG/DL (ref 0.7–1.2)
GFR AFRICAN AMERICAN: >60 ML/MIN
GFR NON-AFRICAN AMERICAN: >60 ML/MIN
GFR SERPL CREATININE-BSD FRML MDRD: ABNORMAL ML/MIN/{1.73_M2}
GFR SERPL CREATININE-BSD FRML MDRD: ABNORMAL ML/MIN/{1.73_M2}
GLOBULIN: NORMAL G/DL (ref 1.5–3.8)
GLUCOSE FASTING: 106 MG/DL (ref 70–99)
HDLC SERPL-MCNC: 55 MG/DL
LDL CHOLESTEROL: 109 MG/DL (ref 0–130)
POTASSIUM SERPL-SCNC: 4.2 MMOL/L (ref 3.7–5.3)
PROSTATE SPECIFIC ANTIGEN: 0.68 UG/L
SODIUM BLD-SCNC: 140 MMOL/L (ref 135–144)
TOTAL PROTEIN: 6.9 G/DL (ref 6.4–8.3)
TRIGLYCERIDE, FASTING: 110 MG/DL
VLDLC SERPL CALC-MCNC: NORMAL MG/DL (ref 1–30)

## 2020-12-17 PROCEDURE — 36415 COLL VENOUS BLD VENIPUNCTURE: CPT

## 2020-12-17 PROCEDURE — G0103 PSA SCREENING: HCPCS

## 2020-12-17 PROCEDURE — 80061 LIPID PANEL: CPT

## 2020-12-17 PROCEDURE — 80076 HEPATIC FUNCTION PANEL: CPT

## 2020-12-17 PROCEDURE — 80048 BASIC METABOLIC PNL TOTAL CA: CPT

## 2020-12-17 RX ORDER — PANTOPRAZOLE SODIUM 40 MG/1
40 TABLET, DELAYED RELEASE ORAL
Qty: 30 TABLET | Refills: 5 | Status: SHIPPED | OUTPATIENT
Start: 2020-12-17 | End: 2021-07-14 | Stop reason: SDUPTHER

## 2021-03-26 DIAGNOSIS — E78.5 HYPERLIPIDEMIA, UNSPECIFIED HYPERLIPIDEMIA TYPE: Chronic | ICD-10-CM

## 2021-03-27 RX ORDER — PRAVASTATIN SODIUM 20 MG
20 TABLET ORAL DAILY
Qty: 90 TABLET | Refills: 0 | Status: SHIPPED | OUTPATIENT
Start: 2021-03-27 | End: 2021-06-24

## 2021-05-21 ENCOUNTER — PATIENT MESSAGE (OUTPATIENT)
Dept: PRIMARY CARE CLINIC | Age: 66
End: 2021-05-21

## 2021-05-24 NOTE — TELEPHONE ENCOUNTER
Pt requesting new [address] will not allow me to send a refill request - states it needs to be a new RX.

## 2021-06-24 ENCOUNTER — OFFICE VISIT (OUTPATIENT)
Dept: PRIMARY CARE CLINIC | Age: 66
End: 2021-06-24
Payer: MEDICARE

## 2021-06-24 VITALS
OXYGEN SATURATION: 97 % | SYSTOLIC BLOOD PRESSURE: 116 MMHG | BODY MASS INDEX: 25.82 KG/M2 | DIASTOLIC BLOOD PRESSURE: 70 MMHG | HEART RATE: 72 BPM | HEIGHT: 68 IN | WEIGHT: 170.4 LBS

## 2021-06-24 DIAGNOSIS — E78.5 HYPERLIPIDEMIA, UNSPECIFIED HYPERLIPIDEMIA TYPE: Chronic | ICD-10-CM

## 2021-06-24 DIAGNOSIS — S76.019A STRAIN OF FASCIA OF MUSCLE OF HIP: ICD-10-CM

## 2021-06-24 DIAGNOSIS — Z00.00 ROUTINE GENERAL MEDICAL EXAMINATION AT A HEALTH CARE FACILITY: Primary | ICD-10-CM

## 2021-06-24 PROCEDURE — G0439 PPPS, SUBSEQ VISIT: HCPCS | Performed by: FAMILY MEDICINE

## 2021-06-24 RX ORDER — PRAVASTATIN SODIUM 20 MG
20 TABLET ORAL DAILY
Qty: 90 TABLET | Refills: 3 | Status: SHIPPED | OUTPATIENT
Start: 2021-06-24 | End: 2022-03-21 | Stop reason: SDUPTHER

## 2021-06-24 SDOH — ECONOMIC STABILITY: FOOD INSECURITY: WITHIN THE PAST 12 MONTHS, YOU WORRIED THAT YOUR FOOD WOULD RUN OUT BEFORE YOU GOT MONEY TO BUY MORE.: NEVER TRUE

## 2021-06-24 SDOH — ECONOMIC STABILITY: FOOD INSECURITY: WITHIN THE PAST 12 MONTHS, THE FOOD YOU BOUGHT JUST DIDN'T LAST AND YOU DIDN'T HAVE MONEY TO GET MORE.: NEVER TRUE

## 2021-06-24 ASSESSMENT — PATIENT HEALTH QUESTIONNAIRE - PHQ9
SUM OF ALL RESPONSES TO PHQ QUESTIONS 1-9: 0
SUM OF ALL RESPONSES TO PHQ9 QUESTIONS 1 & 2: 0
SUM OF ALL RESPONSES TO PHQ QUESTIONS 1-9: 0
2. FEELING DOWN, DEPRESSED OR HOPELESS: 0
SUM OF ALL RESPONSES TO PHQ QUESTIONS 1-9: 0
1. LITTLE INTEREST OR PLEASURE IN DOING THINGS: 0

## 2021-06-24 ASSESSMENT — SOCIAL DETERMINANTS OF HEALTH (SDOH): HOW HARD IS IT FOR YOU TO PAY FOR THE VERY BASICS LIKE FOOD, HOUSING, MEDICAL CARE, AND HEATING?: NOT HARD AT ALL

## 2021-06-24 NOTE — PROGRESS NOTES
Medicare Annual Wellness Visit  Name: Bishop Nunez Date: 2021   MRN: [id number] Sex: Male   Age: 74 y.o. Ethnicity: Non-/Non    : 1955 Race: Vahe Moscoso is here for Hip Pain (Right) and Medication Check    Screenings for behavioral, psychosocial and functional/safety risks, and cognitive dysfunction are all negative except as indicated below. These results, as well as other patient data from the [address] [facility] [address] [address] form, are documented in Flowsheets linked to this Encounter. Allergies   Allergen Reactions    Neosporin Original [Bacitracin-Neomycin-Polymyxin] Itching    Seasonal          Prior to Visit Medications    Medication Sig Taking?  Authorizing Provider   pravastatin (PRAVACHOL) 20 MG tablet Take 1 tablet by mouth daily Yes Bill Zepeda MD   diclofenac sodium (VOLTAREN) 1 % GEL Apply 2 g topically 2 times daily Yes Bill Zepeda MD   pantoprazole (PROTONIX) 40 MG tablet Take 1 tablet by mouth every morning (before breakfast) Yes Bill Zepeda MD   timolol (TIMOPTIC) 0.5 % ophthalmic solution  Yes Historical Provider, MD         Past Medical History:   Diagnosis Date    Asthma due to seasonal allergies     Eye pressure     Glaucoma     History of kidney stones        Past Surgical History:   Procedure Laterality Date    ANKLE SURGERY Right     COLONOSCOPY  ; due 2018   [facility]         Family History   Problem Relation Age of Onset    Atrial Fibrillation Mother        CareTeam (Including outside providers/suppliers regularly involved in providing care):   Patient Care Team:  Bill Zepeda MD as PCP - General (Family Medicine)  Bill Zepeda MD as PCP - [facility] Empaneled Provider  MD Adelina Vazquez MD as Consulting Physician  Ree Govea (Urology)    Wt Readings from Last 3 Encounters:   21 170 lb 6.4 oz (77.3 kg)   20 172 lb 3.2 oz (78.1 kg)   02/03/20 178 lb 9.6 oz (81 kg)     Vitals:    06/24/21 1627   BP: 116/70   Pulse: 72   SpO2: 97%   Weight: 170 lb 6.4 oz (77.3 kg)   Height: 5' 8\" (1.727 m)     Body mass index is 25.91 kg/m². Based upon direct observation of the patient, evaluation of cognition reveals recent and remote memory intact. General Appearance: alert and oriented to person, place and time, well developed and well- nourished, in no acute distress  Skin: warm and dry, no rash or erythema  Head: normocephalic and atraumatic  Eyes: pupils equal, round, and reactive to light, extraocular eye movements intact, conjunctivae normal  ENT: tympanic membrane, external ear and ear canal normal bilaterally, nose without deformity, nasal mucosa and turbinates normal without polyps  Neck: supple and non-tender without mass, no thyromegaly or thyroid nodules, no cervical lymphadenopathy  Pulmonary/Chest: clear to auscultation bilaterally- no wheezes, rales or rhonchi, normal air movement, no respiratory distress  Cardiovascular: normal rate, regular rhythm, normal S1 and S2, no murmurs, rubs, clicks, or gallops, distal pulses intact, no carotid bruits  Abdomen: soft, non-tender, non-distended, normal bowel sounds, no masses or organomegaly  Extremities: no cyanosis, clubbing or edema  Musculoskeletal: normal range of motion, no joint swelling, deformity or tenderness  Neurologic: reflexes normal and symmetric, no cranial nerve deficit, gait, coordination and speech normal    Patient's complete Health Risk Assessment and screening values have been reviewed and are found in Flowsheets. The following problems were reviewed today and where indicated follow up appointments were made and/or referrals ordered. Positive Risk Factor Screenings with Interventions:            General Health and ACP:  General  In general, how would you say your health is?: Excellent  In the past 7 days, have you experienced any of the following?  New or Increased Pain, New or Increased Fatigue, Loneliness, Social Isolation, Stress or Anger?: None of These  Do you get the social and emotional support that you need?: Yes  Do you have a Living Will?: Yes  Advance Directives     Power of  Living Will ACP-Advance Directive ACP-Power of     Not on File Not on File Not on File Not on File      General Health Risk Interventions:  · Has living will         Personalized Preventive Plan   Current Health Maintenance Status  Immunization History   Administered Date(s) Administered    COVID-19, Moderna, PF, 100mcg/0.5mL 01/05/2021, 02/02/2021    Hepatitis B Ped/Adol (Engerix-B, Recombivax HB) 11/29/1995, 01/04/1996, 06/04/1996    Influenza Vaccine, unspecified formulation 09/01/2017    Influenza Virus Vaccine 10/27/2012, 10/14/2014    Influenza, Quadv, IM, PF (6 mo and older Fluzone, Flulaval, Fluarix, and 3 yrs and older Afluria) 09/02/2020    Influenza, Quadv, adjuvanted, 65 yrs +, IM, PF (Fluad) 09/02/2020    Influenza, Triv, inactivated, subunit, adjuvanted, IM (Fluad 65 yrs and older) 09/11/2018, 09/16/2019, 09/02/2020    Pneumococcal Conjugate 13-valent ([id number]) 05/22/2018    Pneumococcal Polysaccharide ([id number]) 06/03/2019    Tdap (Boostrix, Adacel) 08/30/2016    Zoster Live (Zostavax) 10/12/2016    Zoster Recombinant (Shingrix) 05/22/2018, 11/06/2018        Health Maintenance   Topic Date Due    Annual Wellness Visit (AWV)  Never done    Lipid screen  12/17/2021    Colon cancer screen colonoscopy  12/19/2022    DTaP/Tdap/Td vaccine (2 - Td or Tdap) 08/30/2026    Flu vaccine  Completed    Shingles Vaccine  Completed    Pneumococcal 65+ years Vaccine  Completed    COVID-19 Vaccine  Completed    Hepatitis C screen  Completed    Hepatitis A vaccine  Aged Out    Hepatitis B vaccine  Aged Out    Hib vaccine  Aged Out    Meningococcal (ACWY) vaccine  Aged Out     Recommendations for [employer] Due: see orders and patient

## 2021-06-24 NOTE — PATIENT INSTRUCTIONS
Personalized Preventive Plan for Darryl Winslow - 6/24/2021  Medicare offers a range of preventive health benefits. Some of the tests and screenings are paid in full while other may be subject to a deductible, co-insurance, and/or copay. Some of these benefits include a comprehensive review of your medical history including lifestyle, illnesses that may run in your family, and various assessments and screenings as appropriate. After reviewing your medical record and screening and assessments performed today your provider may have ordered immunizations, labs, imaging, and/or referrals for you. A list of these orders (if applicable) as well as your Preventive Care list are included within your After Visit Summary for your review. Other Preventive Recommendations:    · A preventive eye exam performed by an eye specialist is recommended every 1-2 years to screen for glaucoma; cataracts, macular degeneration, and other eye disorders. · A preventive dental visit is recommended every 6 months. · Try to get at least 150 minutes of exercise per week or 10,000 steps per day on a pedometer . · Order or download the FREE \"Exercise & Physical Activity: Your Everyday Guide\" from [employer]. Call 5-229.763.8817 or search [employer] online. · You need 3306-8531 mg of calcium and 6327-3264 IU of vitamin D per day. It is possible to meet your calcium requirement with diet alone, but a vitamin D supplement is usually necessary to meet this goal.  · When exposed to the sun, use a sunscreen that protects against both UVA and UVB radiation with an SPF of 30 or greater. Reapply every 2 to 3 hours or after sweating, drying off with a towel, or swimming. · Always wear a seat belt when traveling in a car. Always wear a helmet when riding a bicycle or motorcycle.   Patient Education        Hip Flexor Strain: Rehab Exercises  Introduction  Here are some examples of exercises for you to try. The exercises may be suggested for a condition or for rehabilitation. Start each exercise slowly. Ease off the exercises if you start to have pain. You will be told when to start these exercises and which ones will work best for you. How to do the exercises  Pelvic tilt with marching   Lie on your back with your knees bent and your feet flat on the floor. Tighten your belly muscles and buttocks, and press your lower back to the floor. Keeping your knees bent, lift and then lower one leg up off the floor, and then lift and lower your other leg like you are marching. Each time you lift your leg, hold that position for about 6 seconds before lowering your leg. Repeat 8 to 12 times. Scissors   Lie on your back with your knees bent at a 90-degree angle and your feet off the floor. Tighten your belly muscles and buttocks, and press your lower back to the floor. Slowly straighten one leg, and hold that position for about 6 seconds. Your leg should be about 12 inches off the floor. Bring that leg back to the starting position, and then straighten your other leg. Hold that position for about 6 seconds, and then switch legs again. Repeat 8 to 12 times. Hamstring stretch (lying down)   Lie flat on your back with your legs straight. If you feel discomfort in your back, place a small towel roll under your lower back. Holding the back of your affected leg for support, lift your leg straight up and toward your body until you feel a stretch at the back of your thigh. Hold the stretch for at least 30 seconds. Repeat 2 to 4 times. Quadricep and hip flexor stretch (lying on side)   Lie on your side with your good leg flat on the floor and your hand supporting your head. Bend your top leg, and reach behind you to grab the front of that foot or ankle with your other hand. Stretch your leg back by pulling your foot toward your buttock. You will feel the stretch in the front of your thigh.  If this causes stress on your knee, do not do this stretch. Hold the stretch for at least 15 to 30 seconds. Repeat 2 to 4 times. Hip flexor stretch (kneeling)   Kneel on your affected leg and bend your good leg out in front of you, with that foot flat on the floor. If you feel discomfort in the front of your knee, place a towel under your knee. Keeping your back straight, slowly push your hips forward until you feel a stretch in the upper thigh of your back leg and hip. Hold the stretch for at least 15 to 30 seconds. Repeat 2 to 4 times. Hip flexor stretch (edge of table)   Lie flat on your back on a table or flat bench, with your knees and lower legs hanging off the edge of the table. Grab your good leg at the knee, and pull that knee back toward your chest. Relax your affected leg and let it hang down toward the floor until you feel a stretch in the upper thigh of your affected leg and hip. Hold the stretch for at least 15 to 30 seconds. Repeat 2 to 4 times. Follow-up care is a key part of your treatment and safety. Be sure to make and go to all appointments, and call your doctor if you are having problems. It's also a good idea to know your test results and keep a list of the medicines you take. Where can you learn more? Go to https://Scene[employer]ot[employer]. org and sign in to your [employer] account. Enter J446 in the [facility] box to learn more about \"Hip Flexor Strain: Rehab Exercises. \"     If you do not have an account, please click on the \"Sign Up Now\" link. Current as of: November 16, 2020               Content Version: 12.9  © 2837-3889 Healthwise, Incorporated. Care instructions adapted under license by [facility] ([facility]). If you have questions about a medical condition or this instruction, always ask your healthcare professional. [address] any warranty or liability for your use of this information.

## 2021-07-14 DIAGNOSIS — K21.9 GASTROESOPHAGEAL REFLUX DISEASE WITHOUT ESOPHAGITIS: ICD-10-CM

## 2021-07-14 RX ORDER — PANTOPRAZOLE SODIUM 40 MG/1
40 TABLET, DELAYED RELEASE ORAL
Qty: 30 TABLET | Refills: 5 | Status: SHIPPED | OUTPATIENT
Start: 2021-07-14 | End: 2022-01-07

## 2022-01-04 ENCOUNTER — HOSPITAL ENCOUNTER (OUTPATIENT)
Age: 67
Discharge: HOME | End: 2022-01-04
Payer: MEDICARE

## 2022-01-04 DIAGNOSIS — Z13.6 ENCOUNTER FOR LIPID SCREENING FOR CARDIOVASCULAR DISEASE: ICD-10-CM

## 2022-01-04 DIAGNOSIS — Z00.00 ANNUAL PHYSICAL EXAM: ICD-10-CM

## 2022-01-04 DIAGNOSIS — Z12.5 SCREENING PSA (PROSTATE SPECIFIC ANTIGEN): ICD-10-CM

## 2022-01-04 DIAGNOSIS — Z13.220 ENCOUNTER FOR LIPID SCREENING FOR CARDIOVASCULAR DISEASE: ICD-10-CM

## 2022-01-04 LAB
ALBUMIN SERPL-MCNC: 4.4 G/DL (ref 3.5–5.2)
ALBUMIN/GLOBULIN RATIO: 1.6 (ref 1–2.5)
ALP BLD-CCNC: 58 U/L (ref 40–129)
ALT SERPL-CCNC: 18 U/L (ref 5–41)
ANION GAP SERPL CALCULATED.3IONS-SCNC: 11 MMOL/L (ref 9–17)
AST SERPL-CCNC: 17 U/L
BILIRUB SERPL-MCNC: 0.56 MG/DL (ref 0.3–1.2)
BILIRUBIN DIRECT: 0.14 MG/DL
BILIRUBIN, INDIRECT: 0.42 MG/DL (ref 0–1)
BUN BLDV-MCNC: 19 MG/DL (ref 8–23)
BUN/CREAT BLD: ABNORMAL (ref 9–20)
CALCIUM SERPL-MCNC: 9.8 MG/DL (ref 8.6–10.4)
CHLORIDE BLD-SCNC: 103 MMOL/L (ref 98–107)
CHOLESTEROL, FASTING: 203 MG/DL
CHOLESTEROL/HDL RATIO: 3.8
CO2: 25 MMOL/L (ref 20–31)
CREAT SERPL-MCNC: 0.98 MG/DL (ref 0.7–1.2)
GFR AFRICAN AMERICAN: >60 ML/MIN
GFR NON-AFRICAN AMERICAN: >60 ML/MIN
GFR SERPL CREATININE-BSD FRML MDRD: ABNORMAL ML/MIN/{1.73_M2}
GFR SERPL CREATININE-BSD FRML MDRD: ABNORMAL ML/MIN/{1.73_M2}
GLOBULIN: NORMAL G/DL (ref 1.5–3.8)
GLUCOSE FASTING: 110 MG/DL (ref 70–99)
HDLC SERPL-MCNC: 53 MG/DL
LDL CHOLESTEROL: 131 MG/DL (ref 0–130)
POTASSIUM SERPL-SCNC: 4.8 MMOL/L (ref 3.7–5.3)
SODIUM BLD-SCNC: 139 MMOL/L (ref 135–144)
TOTAL PROTEIN: 7.1 G/DL (ref 6.4–8.3)
TRIGLYCERIDE, FASTING: 93 MG/DL
VLDLC SERPL CALC-MCNC: ABNORMAL MG/DL (ref 1–30)

## 2022-01-04 PROCEDURE — 80048 BASIC METABOLIC PNL TOTAL CA: CPT

## 2022-01-04 PROCEDURE — 36415 COLL VENOUS BLD VENIPUNCTURE: CPT

## 2022-01-04 PROCEDURE — G0103 PSA SCREENING: HCPCS

## 2022-01-04 PROCEDURE — 80076 HEPATIC FUNCTION PANEL: CPT

## 2022-01-04 PROCEDURE — 80061 LIPID PANEL: CPT

## 2022-01-05 LAB — PROSTATE SPECIFIC ANTIGEN: 0.81 UG/L

## 2022-03-21 DIAGNOSIS — E78.5 HYPERLIPIDEMIA, UNSPECIFIED HYPERLIPIDEMIA TYPE: Chronic | ICD-10-CM

## 2022-03-21 RX ORDER — PRAVASTATIN SODIUM 20 MG
20 TABLET ORAL 2 TIMES DAILY
Qty: 60 TABLET | Refills: 0 | Status: SHIPPED | OUTPATIENT
Start: 2022-03-21 | End: 2022-06-20

## 2022-03-22 ENCOUNTER — OFFICE VISIT (OUTPATIENT)
Dept: PRIMARY CARE CLINIC | Age: 67
End: 2022-03-22
Payer: MEDICARE

## 2022-03-22 ENCOUNTER — HOSPITAL ENCOUNTER (OUTPATIENT)
Age: 67
Setting detail: SPECIMEN
Discharge: HOME | End: 2022-03-22

## 2022-03-22 VITALS
BODY MASS INDEX: 25.88 KG/M2 | SYSTOLIC BLOOD PRESSURE: 120 MMHG | HEART RATE: 73 BPM | WEIGHT: 170.2 LBS | TEMPERATURE: 99.7 F | DIASTOLIC BLOOD PRESSURE: 72 MMHG | OXYGEN SATURATION: 98 %

## 2022-03-22 DIAGNOSIS — R05.9 COUGH: Primary | ICD-10-CM

## 2022-03-22 DIAGNOSIS — R68.83 CHILLS: ICD-10-CM

## 2022-03-22 LAB
INFLUENZA A ANTIBODY: NORMAL
INFLUENZA B ANTIBODY: NORMAL

## 2022-03-22 PROCEDURE — 87804 INFLUENZA ASSAY W/OPTIC: CPT | Performed by: PHYSICIAN ASSISTANT

## 2022-03-22 PROCEDURE — 99213 OFFICE O/P EST LOW 20 MIN: CPT | Performed by: PHYSICIAN ASSISTANT

## 2022-03-22 ASSESSMENT — ENCOUNTER SYMPTOMS
COUGH: 0
CHEST TIGHTNESS: 0
VOMITING: 0
ABDOMINAL PAIN: 0
EYE DISCHARGE: 0
DIARRHEA: 0
RHINORRHEA: 0
SHORTNESS OF BREATH: 0
CONSTIPATION: 0
PHOTOPHOBIA: 0
ABDOMINAL DISTENTION: 0
SINUS PRESSURE: 0
SORE THROAT: 1

## 2022-03-22 NOTE — PROGRESS NOTES
[address] [facility] PRIMARY CARE  [address] [facility] [address]  Dept: [phone]    William Salvador is a 69 y.o. male Established patient, who presents today for his medical conditions/complaints as noted below. Chief Complaint   Patient presents with    Pharyngitis     3 days    Fatigue    Generalized Body Aches       HPI:     HPI: The patient is here with concerns of sore throat, fatigue, and body aches over the last few days. Low grade headache. No loss of smell. Chills on and off. No productive cough. Reviewed prior notes None  Reviewed previous Labs    LDL Cholesterol (mg/dL)   Date Value   01/04/2022 131 (H)   12/17/2020 109   12/04/2019 96     LDL Calculated (mg/dL)   Date Value   05/25/2017 121   03/04/2015 143   03/21/2014 149       (goal LDL is <100)   AST (U/L)   Date Value   01/04/2022 17     ALT (U/L)   Date Value   01/04/2022 18     BUN (mg/dL)   Date Value   01/04/2022 19     TSH (mIU/L)   Date Value   09/25/2018 2.35     BP Readings from Last 3 Encounters:   03/22/22 120/72   06/24/21 116/70   06/08/20 118/74          (goal 120/80)    Past Medical History:   Diagnosis Date    Asthma due to seasonal allergies     Eye pressure     Glaucoma     History of kidney stones       Past Surgical History:   Procedure Laterality Date    ANKLE SURGERY Right 1982    COLONOSCOPY  12/13; due 2018    [facility]       Family History   Problem Relation Age of Onset    Atrial Fibrillation Mother        Social History     Tobacco Use    Smoking status: Never Smoker    Smokeless tobacco: Never Used   Substance Use Topics    Alcohol use: Yes     Alcohol/week: 3.0 standard drinks     Types: 3 Glasses of wine per week     Comment: drinks red merlot 2-3 times per week.        Current Outpatient Medications   Medication Sig Dispense Refill    pravastatin (PRAVACHOL) 20 MG tablet Take 1 tablet by mouth 2 times daily 60 tablet 0 worsen then patient may need atb. Patient given educational materials - see patient instructions. Discussed use, benefit, and side effects of prescribed medications. All patient questions answered. Pt voiced understanding. Reviewed health maintenance. Instructed to continue current medications, diet and exercise. Patient agreed with treatment plan. Follow up as directed.      Electronically signed by JOSE Mckeon on 3/22/2022 at 10:32 AM

## 2022-03-23 DIAGNOSIS — R68.83 CHILLS: ICD-10-CM

## 2022-03-23 DIAGNOSIS — R05.9 COUGH: ICD-10-CM

## 2022-03-23 LAB
SARS-COV-2: NORMAL
SARS-COV-2: NOT DETECTED
SOURCE: NORMAL

## 2022-03-24 ENCOUNTER — TELEPHONE (OUTPATIENT)
Dept: PRIMARY CARE CLINIC | Age: 67
End: 2022-03-24

## 2022-03-24 DIAGNOSIS — R05.9 COUGH: ICD-10-CM

## 2022-03-24 DIAGNOSIS — R52 BODY ACHES: Primary | ICD-10-CM

## 2022-03-24 NOTE — TELEPHONE ENCOUNTER
Pt saw Rosendomichael Daviddidi on 3/22/22. Flu and covid test were neg. The last 2 days, pt's legs have become much more achy and causing not to be able to sleep. Tyl, Benadryl not not helping. Thinks that it may be from the increase of his Pravastatin from 20 to 40mg. Asking if he can get some relief for this? Would like your opinion. NANCI Gar listed.

## 2022-03-24 NOTE — TELEPHONE ENCOUNTER
----- Message from Thierno Perdomo sent at 3/24/2022  8:26 AM EDT -----  Subject: Message to Provider    QUESTIONS  Information for Provider? Patient is having joint pain in hips and knees. He cannot get comfortable sitting or sleeping. May still have a low fever. Tylenol and benedryl is not helping. What can he do? Would he need to come   in for an appointment?   ---------------------------------------------------------------------------  --------------  [address]  What is the best way for the office to contact you? OK to leave message on   voicemail  Preferred Call Back Phone Number? [phone]  ---------------------------------------------------------------------------  --------------  SCRIPT ANSWERS  Relationship to Patient?  Self

## 2022-03-25 RX ORDER — AMOXICILLIN AND CLAVULANATE POTASSIUM 875; 125 MG/1; MG/1
1 TABLET, FILM COATED ORAL 2 TIMES DAILY
Qty: 20 TABLET | Refills: 0 | Status: SHIPPED | OUTPATIENT
Start: 2022-03-25 | End: 2022-04-04

## 2022-06-20 DIAGNOSIS — E78.5 HYPERLIPIDEMIA, UNSPECIFIED HYPERLIPIDEMIA TYPE: Chronic | ICD-10-CM

## 2022-06-20 RX ORDER — PRAVASTATIN SODIUM 20 MG
TABLET ORAL
Qty: 90 TABLET | Refills: 3 | Status: SHIPPED | OUTPATIENT
Start: 2022-06-20

## 2022-11-29 DIAGNOSIS — K21.9 GASTROESOPHAGEAL REFLUX DISEASE WITHOUT ESOPHAGITIS: ICD-10-CM

## 2022-11-29 RX ORDER — PANTOPRAZOLE SODIUM 40 MG/1
TABLET, DELAYED RELEASE ORAL
Qty: 90 TABLET | Refills: 3 | Status: SHIPPED | OUTPATIENT
Start: 2022-11-29

## 2023-03-07 ENCOUNTER — HOSPITAL ENCOUNTER (OUTPATIENT)
Age: 68
Discharge: HOME | End: 2023-03-07
Payer: MEDICARE

## 2023-03-07 DIAGNOSIS — E78.5 HYPERLIPIDEMIA, UNSPECIFIED HYPERLIPIDEMIA TYPE: ICD-10-CM

## 2023-03-07 LAB
CHOLEST SERPL-MCNC: 199 MG/DL
CHOLESTEROL/HDL RATIO: 3.8
HDLC SERPL-MCNC: 53 MG/DL
LDLC SERPL CALC-MCNC: 128 MG/DL (ref 0–130)
TRIGL SERPL-MCNC: 88 MG/DL

## 2023-03-07 PROCEDURE — 80061 LIPID PANEL: CPT

## 2023-03-07 PROCEDURE — 36415 COLL VENOUS BLD VENIPUNCTURE: CPT

## 2023-06-15 DIAGNOSIS — E78.5 HYPERLIPIDEMIA, UNSPECIFIED HYPERLIPIDEMIA TYPE: Chronic | ICD-10-CM

## 2023-06-20 DIAGNOSIS — E78.5 HYPERLIPIDEMIA, UNSPECIFIED HYPERLIPIDEMIA TYPE: Chronic | ICD-10-CM

## 2023-06-20 RX ORDER — PRAVASTATIN SODIUM 20 MG
TABLET ORAL
Qty: 90 TABLET | Refills: 3 | OUTPATIENT
Start: 2023-06-20

## 2023-06-20 RX ORDER — PRAVASTATIN SODIUM 20 MG
20 TABLET ORAL DAILY
Qty: 30 TABLET | Refills: 0 | Status: SHIPPED | OUTPATIENT
Start: 2023-06-20 | End: 2023-06-23 | Stop reason: SDUPTHER

## 2023-06-20 NOTE — TELEPHONE ENCOUNTER
----- Message from Foreignnel Salazar sent at 6/20/2023  1:46 PM EDT -----  Subject: Refill Request    QUESTIONS  Name of Medication? pravastatin (PRAVACHOL) 20 MG tablet  Patient-reported dosage and instructions? 20 MG Once daily  How many days do you have left? 5  Preferred Pharmacy? [address] phone number (if available)? [phone]  Additional Information for Provider? Patient is requesting a one month qty   refill to last until his upcoming appt   ---------------------------------------------------------------------------  --------------  [address]  What is the best way for the office to contact you? OK to leave message on   voicemail  Preferred Call Back Phone Number? [phone]  ---------------------------------------------------------------------------  --------------  SCRIPT ANSWERS  Relationship to Patient?  Self

## 2023-06-22 DIAGNOSIS — E78.5 HYPERLIPIDEMIA, UNSPECIFIED HYPERLIPIDEMIA TYPE: Chronic | ICD-10-CM

## 2023-06-23 RX ORDER — PRAVASTATIN SODIUM 20 MG
20 TABLET ORAL DAILY
Qty: 90 TABLET | Refills: 0 | Status: SHIPPED | OUTPATIENT
Start: 2023-06-23

## 2023-07-18 SDOH — ECONOMIC STABILITY: FOOD INSECURITY: WITHIN THE PAST 12 MONTHS, THE FOOD YOU BOUGHT JUST DIDN'T LAST AND YOU DIDN'T HAVE MONEY TO GET MORE.: NEVER TRUE

## 2023-07-18 SDOH — ECONOMIC STABILITY: TRANSPORTATION INSECURITY
IN THE PAST 12 MONTHS, HAS LACK OF TRANSPORTATION KEPT YOU FROM MEETINGS, WORK, OR FROM GETTING THINGS NEEDED FOR DAILY LIVING?: NO

## 2023-07-18 SDOH — ECONOMIC STABILITY: HOUSING INSECURITY
IN THE LAST 12 MONTHS, WAS THERE A TIME WHEN YOU DID NOT HAVE A STEADY PLACE TO SLEEP OR SLEPT IN A SHELTER (INCLUDING NOW)?: NO

## 2023-07-18 SDOH — ECONOMIC STABILITY: FOOD INSECURITY: WITHIN THE PAST 12 MONTHS, YOU WORRIED THAT YOUR FOOD WOULD RUN OUT BEFORE YOU GOT MONEY TO BUY MORE.: NEVER TRUE

## 2023-07-18 SDOH — ECONOMIC STABILITY: INCOME INSECURITY: HOW HARD IS IT FOR YOU TO PAY FOR THE VERY BASICS LIKE FOOD, HOUSING, MEDICAL CARE, AND HEATING?: NOT VERY HARD

## 2023-07-18 ASSESSMENT — PATIENT HEALTH QUESTIONNAIRE - PHQ9
SUM OF ALL RESPONSES TO PHQ QUESTIONS 1-9: 0
SUM OF ALL RESPONSES TO PHQ QUESTIONS 1-9: 0
SUM OF ALL RESPONSES TO PHQ9 QUESTIONS 1 & 2: 0
SUM OF ALL RESPONSES TO PHQ QUESTIONS 1-9: 0
SUM OF ALL RESPONSES TO PHQ QUESTIONS 1-9: 0
1. LITTLE INTEREST OR PLEASURE IN DOING THINGS: NOT AT ALL
2. FEELING DOWN, DEPRESSED OR HOPELESS: 0
2. FEELING DOWN, DEPRESSED OR HOPELESS: NOT AT ALL
SUM OF ALL RESPONSES TO PHQ9 QUESTIONS 1 & 2: 0
1. LITTLE INTEREST OR PLEASURE IN DOING THINGS: 0

## 2023-07-19 ENCOUNTER — OFFICE VISIT (OUTPATIENT)
Dept: PRIMARY CARE CLINIC | Age: 68
End: 2023-07-19
Payer: MEDICARE

## 2023-07-19 VITALS
WEIGHT: 172.4 LBS | BODY MASS INDEX: 26.13 KG/M2 | OXYGEN SATURATION: 97 % | SYSTOLIC BLOOD PRESSURE: 136 MMHG | HEIGHT: 68 IN | DIASTOLIC BLOOD PRESSURE: 80 MMHG | HEART RATE: 61 BPM

## 2023-07-19 DIAGNOSIS — Z00.00 ANNUAL PHYSICAL EXAM: ICD-10-CM

## 2023-07-19 DIAGNOSIS — Z13.6 ENCOUNTER FOR LIPID SCREENING FOR CARDIOVASCULAR DISEASE: ICD-10-CM

## 2023-07-19 DIAGNOSIS — Z12.5 SCREENING PSA (PROSTATE SPECIFIC ANTIGEN): ICD-10-CM

## 2023-07-19 DIAGNOSIS — Z00.00 MEDICARE ANNUAL WELLNESS VISIT, SUBSEQUENT: Primary | ICD-10-CM

## 2023-07-19 DIAGNOSIS — Z13.220 ENCOUNTER FOR LIPID SCREENING FOR CARDIOVASCULAR DISEASE: ICD-10-CM

## 2023-07-19 DIAGNOSIS — E78.5 HYPERLIPIDEMIA, UNSPECIFIED HYPERLIPIDEMIA TYPE: Chronic | ICD-10-CM

## 2023-07-19 PROCEDURE — 1123F ACP DISCUSS/DSCN MKR DOCD: CPT | Performed by: FAMILY MEDICINE

## 2023-07-19 PROCEDURE — G0439 PPPS, SUBSEQ VISIT: HCPCS | Performed by: FAMILY MEDICINE

## 2023-07-19 RX ORDER — PRAVASTATIN SODIUM 20 MG
20 TABLET ORAL DAILY
Qty: 90 TABLET | Refills: 3 | Status: SHIPPED | OUTPATIENT
Start: 2023-07-19

## 2023-07-19 ASSESSMENT — PATIENT HEALTH QUESTIONNAIRE - PHQ9
1. LITTLE INTEREST OR PLEASURE IN DOING THINGS: 0
SUM OF ALL RESPONSES TO PHQ QUESTIONS 1-9: 0
2. FEELING DOWN, DEPRESSED OR HOPELESS: 0
SUM OF ALL RESPONSES TO PHQ QUESTIONS 1-9: 0
SUM OF ALL RESPONSES TO PHQ9 QUESTIONS 1 & 2: 0

## 2023-07-19 ASSESSMENT — LIFESTYLE VARIABLES
HOW MANY STANDARD DRINKS CONTAINING ALCOHOL DO YOU HAVE ON A TYPICAL DAY: PATIENT DOES NOT DRINK
HOW OFTEN DO YOU HAVE A DRINK CONTAINING ALCOHOL: 2-4 TIMES A MONTH

## 2023-07-28 ENCOUNTER — HOSPITAL ENCOUNTER (OUTPATIENT)
Age: 68
Discharge: HOME | End: 2023-07-28
Payer: MEDICARE

## 2023-07-28 DIAGNOSIS — Z13.6 ENCOUNTER FOR LIPID SCREENING FOR CARDIOVASCULAR DISEASE: ICD-10-CM

## 2023-07-28 DIAGNOSIS — Z12.5 SCREENING PSA (PROSTATE SPECIFIC ANTIGEN): ICD-10-CM

## 2023-07-28 DIAGNOSIS — Z13.220 ENCOUNTER FOR LIPID SCREENING FOR CARDIOVASCULAR DISEASE: ICD-10-CM

## 2023-07-28 DIAGNOSIS — Z00.00 ANNUAL PHYSICAL EXAM: ICD-10-CM

## 2023-07-28 LAB
ALBUMIN SERPL-MCNC: 4 G/DL (ref 3.5–5.2)
ALBUMIN/GLOB SERPL: 1.5 {RATIO} (ref 1–2.5)
ALP SERPL-CCNC: 58 U/L (ref 40–129)
ALT SERPL-CCNC: 18 U/L (ref 5–41)
ANION GAP SERPL CALCULATED.3IONS-SCNC: 14 MMOL/L (ref 9–17)
AST SERPL-CCNC: 17 U/L
BILIRUB DIRECT SERPL-MCNC: 0.1 MG/DL
BILIRUB INDIRECT SERPL-MCNC: 0.3 MG/DL (ref 0–1)
BILIRUB SERPL-MCNC: 0.4 MG/DL (ref 0.3–1.2)
BUN SERPL-MCNC: 16 MG/DL (ref 8–23)
CALCIUM SERPL-MCNC: 9.1 MG/DL (ref 8.6–10.4)
CHLORIDE SERPL-SCNC: 103 MMOL/L (ref 98–107)
CHOLEST SERPL-MCNC: 167 MG/DL
CHOLESTEROL/HDL RATIO: 3.6
CO2 SERPL-SCNC: 23 MMOL/L (ref 20–31)
CREAT SERPL-MCNC: 0.9 MG/DL (ref 0.7–1.2)
GFR SERPL CREATININE-BSD FRML MDRD: >60 ML/MIN/1.73M2
GLUCOSE P FAST SERPL-MCNC: 116 MG/DL (ref 70–99)
HDLC SERPL-MCNC: 46 MG/DL
LDLC SERPL CALC-MCNC: 104 MG/DL (ref 0–130)
POTASSIUM SERPL-SCNC: 4.7 MMOL/L (ref 3.7–5.3)
PROT SERPL-MCNC: 6.6 G/DL (ref 6.4–8.3)
PSA SERPL-MCNC: 0.72 NG/ML
SODIUM SERPL-SCNC: 140 MMOL/L (ref 135–144)
TRIGL SERPL-MCNC: 84 MG/DL

## 2023-07-28 PROCEDURE — 36415 COLL VENOUS BLD VENIPUNCTURE: CPT

## 2023-07-28 PROCEDURE — 80048 BASIC METABOLIC PNL TOTAL CA: CPT

## 2023-07-28 PROCEDURE — 80076 HEPATIC FUNCTION PANEL: CPT

## 2023-07-28 PROCEDURE — 80061 LIPID PANEL: CPT

## 2023-07-28 PROCEDURE — G0103 PSA SCREENING: HCPCS

## 2023-08-16 ENCOUNTER — TELEPHONE (OUTPATIENT)
Dept: PRIMARY CARE CLINIC | Age: 68
End: 2023-08-16

## 2023-08-16 DIAGNOSIS — R73.9 HYPERGLYCEMIA: Primary | ICD-10-CM

## 2023-08-21 ENCOUNTER — TELEPHONE (OUTPATIENT)
Dept: PRIMARY CARE CLINIC | Age: 68
End: 2023-08-21

## 2023-08-21 DIAGNOSIS — E87.20 ACID EXCESS: Primary | ICD-10-CM

## 2023-08-21 NOTE — TELEPHONE ENCOUNTER
I know of no saliva test for this. Can refer to GI for probe monitoring of pH if pt desires. Dr. Kori Rubio group.

## 2023-08-21 NOTE — TELEPHONE ENCOUNTER
Pt  saw his dentist recently and was told that he has more cavities and pitting. States that it is usually due to a high acidity level. Was told to ck with you about getting a saliva test to  for that, or BW. Pt states that he does take a daily pill due to acid reflux.    Dentist is Dr. Hali Ley  [phone]

## 2023-08-23 ENCOUNTER — TELEPHONE (OUTPATIENT)
Dept: GASTROENTEROLOGY | Age: 68
End: 2023-08-23

## 2023-08-25 ENCOUNTER — HOSPITAL ENCOUNTER (OUTPATIENT)
Age: 68
Discharge: HOME | End: 2023-08-25
Payer: MEDICARE

## 2023-08-25 DIAGNOSIS — R73.9 HYPERGLYCEMIA: ICD-10-CM

## 2023-08-25 LAB
EST. AVERAGE GLUCOSE BLD GHB EST-MCNC: 131 MG/DL
HBA1C MFR BLD: 6.2 % (ref 4–6)

## 2023-08-25 PROCEDURE — 36415 COLL VENOUS BLD VENIPUNCTURE: CPT

## 2023-08-25 PROCEDURE — 83036 HEMOGLOBIN GLYCOSYLATED A1C: CPT

## 2023-09-21 ENCOUNTER — OFFICE VISIT (OUTPATIENT)
Dept: GASTROENTEROLOGY | Age: 68
End: 2023-09-21
Payer: MEDICARE

## 2023-09-21 DIAGNOSIS — K21.9 GASTROESOPHAGEAL REFLUX DISEASE, UNSPECIFIED WHETHER ESOPHAGITIS PRESENT: Primary | ICD-10-CM

## 2023-09-21 PROCEDURE — 99203 OFFICE O/P NEW LOW 30 MIN: CPT | Performed by: INTERNAL MEDICINE

## 2023-09-21 PROCEDURE — 1123F ACP DISCUSS/DSCN MKR DOCD: CPT | Performed by: INTERNAL MEDICINE

## 2023-09-21 ASSESSMENT — ENCOUNTER SYMPTOMS
NAUSEA: 0
WHEEZING: 0
BLOOD IN STOOL: 0
CONSTIPATION: 0
VOMITING: 0
RECTAL PAIN: 0
ABDOMINAL DISTENTION: 0
BACK PAIN: 0
ANAL BLEEDING: 0
VOICE CHANGE: 0
ABDOMINAL PAIN: 0
COUGH: 0
TROUBLE SWALLOWING: 0
SINUS PRESSURE: 0
SORE THROAT: 0
DIARRHEA: 0
CHOKING: 0

## 2023-09-21 NOTE — PROGRESS NOTES
RDW 13.9 09/25/2018    MPV 10.4 09/25/2018    BASOPCT 1 08/25/2016    LYMPHSABS 1.50 08/25/2016    MONOSABS 0.40 08/25/2016    NEUTROABS 2.00 08/25/2016    EOSABS 0.10 08/25/2016    BASOSABS 0.00 08/25/2016         DIAGNOSTIC TESTING:     No results found. IMPRESSION: Mr. Mata Chua is a 77 y.o. male with     Assessment:  1. Gastroesophageal reflux disease, unspecified whether esophagitis present        Plan:  Patient is stable. Given his chronic symptoms, will need EGD to evaluate gastroesophageal pathology. Discussed regarding antireflux measures and to continue PPI therapy. Regarding EGD procedure, risks and benefits, patient understood and verbalized consent    Spent 30 minutes providing patient education and counseling. Thank you for allowing me to participate in the care of Mr. Villalpando. For any further questions please do not hesitate to contact me. Note is dictated utilizing voice recognition software. Unfortunately this leads to occasional typographical errors. Please contact our office if you have any questions. I have reviewed and agree with the MA/LPN ROS.      Heidi Dee MD, Tere  Board Certified in Gastroenterology and [facility]  Office #: [phone]

## 2023-09-22 ENCOUNTER — TELEPHONE (OUTPATIENT)
Dept: GASTROENTEROLOGY | Age: 68
End: 2023-09-22

## 2023-09-22 NOTE — TELEPHONE ENCOUNTER
Pt Satish Esparza stating he has prep questions regarding EGD that is scheduled for 9/27/23 with [place], please return call, thanks.

## 2023-09-22 NOTE — TELEPHONE ENCOUNTER
Writer returned patients phone calll, patient wanted to know about EGD if Dr. Maureen Ward would be performing a biopsy during the procedure. Writer stated to patient I will discuss with Dr. Maureen Ward and notify patient by Monday. Writer thanked patient for his call.

## 2024-01-05 NOTE — TELEPHONE ENCOUNTER
A1c pended per result note for high blood sugar. Please add dx since MA's aren't allowed to add new ones. Pt aware it is ordered. Patient has been notified and verb good understanding.

## 2024-01-16 ENCOUNTER — TELEPHONE (OUTPATIENT)
Dept: PRIMARY CARE CLINIC | Age: 69
End: 2024-01-16

## 2024-01-16 RX ORDER — CEPHALEXIN 500 MG/1
500 CAPSULE ORAL 4 TIMES DAILY
Qty: 28 CAPSULE | Refills: 0 | Status: SHIPPED | OUTPATIENT
Start: 2024-01-16 | End: 2024-01-23

## 2024-01-16 NOTE — TELEPHONE ENCOUNTER
Keflex sent in   F/u office no better    Consent: The risks of atrophy were reviewed with the patient. Administered By (Optional): ravi forte Kenalog Preparation: Kenalog Medical Necessity Clause: This procedure was medically necessary because the lesions that were treated were: Concentration Of Solution Injected (Mg/Ml): 2.0 Treatment Number (Optional): 1 Detail Level: Detailed Total Volume Injected (Ccs- Only Use Numbers And Decimals): 0.1 Include Z78.9 (Other Specified Conditions Influencing Health Status) As An Associated Diagnosis?: No X Size Of Lesion In Cm (Optional): 0

## 2024-03-07 ENCOUNTER — PATIENT MESSAGE (OUTPATIENT)
Dept: PRIMARY CARE CLINIC | Age: 69
End: 2024-03-07

## 2024-03-07 DIAGNOSIS — N20.0 NEPHROLITHIASIS: Chronic | ICD-10-CM

## 2024-03-07 DIAGNOSIS — R73.9 HYPERGLYCEMIA: Primary | ICD-10-CM

## 2024-03-08 NOTE — TELEPHONE ENCOUNTER
From: Jensen Villalpando  To: Dr. Dl Sawant  Sent: 3/7/2024 12:13 PM EST  Subject: bloodwork    I have to take a family member for bloodwork. Am I due for any at this time?

## 2024-04-12 DIAGNOSIS — E78.5 HYPERLIPIDEMIA, UNSPECIFIED HYPERLIPIDEMIA TYPE: Chronic | ICD-10-CM

## 2024-04-15 RX ORDER — PRAVASTATIN SODIUM 20 MG
20 TABLET ORAL DAILY
Qty: 90 TABLET | Refills: 3 | Status: SHIPPED | OUTPATIENT
Start: 2024-04-15

## 2024-04-15 NOTE — TELEPHONE ENCOUNTER
Patient comment: I have a new prescription provider, [facility]...would like a 90 day supply. I have 12 pills left.

## 2024-05-02 ENCOUNTER — HOSPITAL ENCOUNTER (OUTPATIENT)
Age: 69
Discharge: HOME | End: 2024-05-02
Payer: MEDICARE

## 2024-05-02 DIAGNOSIS — N20.0 NEPHROLITHIASIS: Chronic | ICD-10-CM

## 2024-05-02 DIAGNOSIS — R73.9 HYPERGLYCEMIA: ICD-10-CM

## 2024-05-02 LAB
ANION GAP SERPL CALCULATED.3IONS-SCNC: 12 MMOL/L (ref 9–16)
BUN SERPL-MCNC: 16 MG/DL (ref 8–23)
CALCIUM SERPL-MCNC: 9.2 MG/DL (ref 8.6–10.4)
CHLORIDE SERPL-SCNC: 103 MMOL/L (ref 98–107)
CO2 SERPL-SCNC: 25 MMOL/L (ref 20–31)
CREAT SERPL-MCNC: 1.1 MG/DL (ref 0.7–1.2)
EST. AVERAGE GLUCOSE BLD GHB EST-MCNC: 123 MG/DL
GFR, ESTIMATED: 75 ML/MIN/1.73M2
GLUCOSE SERPL-MCNC: 108 MG/DL (ref 74–99)
HBA1C MFR BLD: 5.9 % (ref 4–6)
POTASSIUM SERPL-SCNC: 4.4 MMOL/L (ref 3.7–5.3)
SODIUM SERPL-SCNC: 140 MMOL/L (ref 136–145)

## 2024-05-02 PROCEDURE — 80048 BASIC METABOLIC PNL TOTAL CA: CPT

## 2024-05-02 PROCEDURE — 36415 COLL VENOUS BLD VENIPUNCTURE: CPT

## 2024-05-02 PROCEDURE — 83036 HEMOGLOBIN GLYCOSYLATED A1C: CPT

## 2024-05-07 DIAGNOSIS — K21.9 GASTROESOPHAGEAL REFLUX DISEASE WITHOUT ESOPHAGITIS: ICD-10-CM

## 2024-05-08 RX ORDER — PANTOPRAZOLE SODIUM 40 MG/1
40 TABLET, DELAYED RELEASE ORAL
Qty: 90 TABLET | Refills: 1 | Status: SHIPPED | OUTPATIENT
Start: 2024-05-08

## 2024-09-10 DIAGNOSIS — K21.9 GASTROESOPHAGEAL REFLUX DISEASE WITHOUT ESOPHAGITIS: ICD-10-CM

## 2024-09-10 RX ORDER — PANTOPRAZOLE SODIUM 40 MG/1
40 TABLET, DELAYED RELEASE ORAL
Qty: 90 TABLET | Refills: 1 | Status: SHIPPED | OUTPATIENT
Start: 2024-09-10

## 2024-12-17 DIAGNOSIS — E78.5 HYPERLIPIDEMIA, UNSPECIFIED HYPERLIPIDEMIA TYPE: Chronic | ICD-10-CM

## 2024-12-18 RX ORDER — PRAVASTATIN SODIUM 20 MG
20 TABLET ORAL DAILY
Qty: 90 TABLET | Refills: 3 | OUTPATIENT
Start: 2024-12-18

## 2025-02-06 SDOH — HEALTH STABILITY: PHYSICAL HEALTH: ON AVERAGE, HOW MANY MINUTES DO YOU ENGAGE IN EXERCISE AT THIS LEVEL?: 120 MIN

## 2025-02-06 SDOH — HEALTH STABILITY: PHYSICAL HEALTH: ON AVERAGE, HOW MANY DAYS PER WEEK DO YOU ENGAGE IN MODERATE TO STRENUOUS EXERCISE (LIKE A BRISK WALK)?: 5 DAYS

## 2025-02-06 ASSESSMENT — LIFESTYLE VARIABLES
HOW OFTEN DO YOU HAVE A DRINK CONTAINING ALCOHOL: 3
HOW MANY STANDARD DRINKS CONTAINING ALCOHOL DO YOU HAVE ON A TYPICAL DAY: 1 OR 2
HOW OFTEN DO YOU HAVE A DRINK CONTAINING ALCOHOL: 2-4 TIMES A MONTH
HOW MANY STANDARD DRINKS CONTAINING ALCOHOL DO YOU HAVE ON A TYPICAL DAY: 1
HOW OFTEN DO YOU HAVE SIX OR MORE DRINKS ON ONE OCCASION: 1

## 2025-02-06 ASSESSMENT — PATIENT HEALTH QUESTIONNAIRE - PHQ9
SUM OF ALL RESPONSES TO PHQ QUESTIONS 1-9: 0
SUM OF ALL RESPONSES TO PHQ QUESTIONS 1-9: 0
SUM OF ALL RESPONSES TO PHQ9 QUESTIONS 1 & 2: 0
SUM OF ALL RESPONSES TO PHQ QUESTIONS 1-9: 0
2. FEELING DOWN, DEPRESSED OR HOPELESS: NOT AT ALL
SUM OF ALL RESPONSES TO PHQ QUESTIONS 1-9: 0
1. LITTLE INTEREST OR PLEASURE IN DOING THINGS: NOT AT ALL

## 2025-02-10 ENCOUNTER — OFFICE VISIT (OUTPATIENT)
Dept: PRIMARY CARE CLINIC | Age: 70
End: 2025-02-10

## 2025-02-10 VITALS
WEIGHT: 178 LBS | DIASTOLIC BLOOD PRESSURE: 82 MMHG | HEIGHT: 68 IN | OXYGEN SATURATION: 98 % | BODY MASS INDEX: 26.98 KG/M2 | HEART RATE: 60 BPM | SYSTOLIC BLOOD PRESSURE: 134 MMHG

## 2025-02-10 DIAGNOSIS — Z00.00 MEDICARE ANNUAL WELLNESS VISIT, SUBSEQUENT: ICD-10-CM

## 2025-02-10 DIAGNOSIS — Z00.00 ANNUAL PHYSICAL EXAM: ICD-10-CM

## 2025-02-10 DIAGNOSIS — Z13.220 ENCOUNTER FOR LIPID SCREENING FOR CARDIOVASCULAR DISEASE: ICD-10-CM

## 2025-02-10 DIAGNOSIS — Z13.6 ENCOUNTER FOR LIPID SCREENING FOR CARDIOVASCULAR DISEASE: ICD-10-CM

## 2025-02-10 DIAGNOSIS — Z12.5 SCREENING PSA (PROSTATE SPECIFIC ANTIGEN): ICD-10-CM

## 2025-02-10 DIAGNOSIS — E78.5 HYPERLIPIDEMIA, UNSPECIFIED HYPERLIPIDEMIA TYPE: Chronic | ICD-10-CM

## 2025-02-10 DIAGNOSIS — R73.9 HYPERGLYCEMIA: Primary | ICD-10-CM

## 2025-02-10 DIAGNOSIS — K21.9 GASTROESOPHAGEAL REFLUX DISEASE WITHOUT ESOPHAGITIS: ICD-10-CM

## 2025-02-10 LAB — HBA1C MFR BLD: 6.2 %

## 2025-02-10 RX ORDER — PRAVASTATIN SODIUM 20 MG
20 TABLET ORAL DAILY
Qty: 90 TABLET | Refills: 3 | Status: SHIPPED | OUTPATIENT
Start: 2025-02-10

## 2025-02-10 RX ORDER — PANTOPRAZOLE SODIUM 40 MG/1
40 TABLET, DELAYED RELEASE ORAL 2 TIMES DAILY
Qty: 180 TABLET | Refills: 3
Start: 2025-02-10

## 2025-02-10 SDOH — ECONOMIC STABILITY: FOOD INSECURITY: WITHIN THE PAST 12 MONTHS, THE FOOD YOU BOUGHT JUST DIDN'T LAST AND YOU DIDN'T HAVE MONEY TO GET MORE.: NEVER TRUE

## 2025-02-10 SDOH — ECONOMIC STABILITY: FOOD INSECURITY: WITHIN THE PAST 12 MONTHS, YOU WORRIED THAT YOUR FOOD WOULD RUN OUT BEFORE YOU GOT MONEY TO BUY MORE.: NEVER TRUE

## 2025-02-10 NOTE — PROGRESS NOTES
Medicare Annual Wellness Visit    Jensen Villalpando is here for Medicare AWV (Medicare wellness visit/)    Assessment & Plan  1. Diabetes Mellitus.  His last A1c was 5.9 on 05/02/2024, indicating good control. An order for annual blood work, including A1c, has been placed to monitor his condition.    2. Acid Reflux.  He reports occasional acid reflux, which has affected his teeth. His pantoprazole dosage has been doubled to 40 mg twice a day, taken before meals. He is advised to follow up with his dentist to monitor any improvements or changes.    3. Glaucoma.  He continues to use his glaucoma drops as prescribed.    4. Health Maintenance.  He is up to date on his colonoscopy until 2033. His tetanus vaccine is due after 08/30/2026. He has completed his shingles, pneumonia, influenza, and RSV vaccines. His last PSA and cholesterol tests were on 07/28/2023, so he is due for these tests. Annual blood work, including cholesterol, prostate, liver, and kidney function tests, has been ordered. He is advised to continue taking vitamin D3 supplements three times a week and consider adding CoQ10 to his regimen due to his use of pravastatin.    PROCEDURE  Endoscopy results were normal.  Hyperglycemia  -     POCT glycosylated hemoglobin (Hb A1C)  Encounter for lipid screening for cardiovascular disease  -     Lipid, Fasting; Future  Annual physical exam  -     Basic Metabolic Panel, Fasting; Future  -     Hepatic Function Panel; Future  Screening PSA (prostate specific antigen)  -     PSA Screening; Future  Medicare annual wellness visit, subsequent  Gastroesophageal reflux disease without esophagitis  -     pantoprazole (PROTONIX) 40 MG tablet; Take 1 tablet by mouth in the morning and at bedtime, Disp-180 tablet, R-3NO PRINT  Hyperlipidemia, unspecified hyperlipidemia type  -     pravastatin (PRAVACHOL) 20 MG tablet; Take 1 tablet by mouth daily, Disp-90 tablet, R-3Normal    Results  Laboratory Studies  Last A1c was 5.9 on

## 2025-02-24 ENCOUNTER — TELEPHONE (OUTPATIENT)
Dept: PRIMARY CARE CLINIC | Age: 70
End: 2025-02-24

## 2025-02-24 DIAGNOSIS — S76.319A HAMSTRING STRAIN, UNSPECIFIED LATERALITY, INITIAL ENCOUNTER: Primary | ICD-10-CM

## 2025-02-24 NOTE — TELEPHONE ENCOUNTER
Patient believes he tore a hamstring today. Asking for a referral to physical therapy [facility]         Fax [phone]

## 2025-05-08 ENCOUNTER — HOSPITAL ENCOUNTER (OUTPATIENT)
Age: 70
Discharge: HOME | End: 2025-05-08
Payer: MEDICARE

## 2025-05-08 DIAGNOSIS — Z12.5 SCREENING PSA (PROSTATE SPECIFIC ANTIGEN): ICD-10-CM

## 2025-05-08 DIAGNOSIS — Z13.6 ENCOUNTER FOR LIPID SCREENING FOR CARDIOVASCULAR DISEASE: ICD-10-CM

## 2025-05-08 DIAGNOSIS — Z00.00 ANNUAL PHYSICAL EXAM: ICD-10-CM

## 2025-05-08 DIAGNOSIS — Z13.220 ENCOUNTER FOR LIPID SCREENING FOR CARDIOVASCULAR DISEASE: ICD-10-CM

## 2025-05-08 LAB
ALBUMIN SERPL-MCNC: 4.4 G/DL (ref 3.5–5.2)
ALBUMIN/GLOB SERPL: 1.8 {RATIO} (ref 1–2.5)
ALP SERPL-CCNC: 60 U/L (ref 40–129)
ALT SERPL-CCNC: 23 U/L (ref 10–50)
ANION GAP SERPL CALCULATED.3IONS-SCNC: 10 MMOL/L (ref 9–16)
AST SERPL-CCNC: 20 U/L (ref 10–50)
BILIRUB DIRECT SERPL-MCNC: 0.2 MG/DL (ref 0–0.2)
BILIRUB INDIRECT SERPL-MCNC: 0.5 MG/DL (ref 0–1)
BILIRUB SERPL-MCNC: 0.7 MG/DL (ref 0–1.2)
BUN SERPL-MCNC: 15 MG/DL (ref 8–23)
CALCIUM SERPL-MCNC: 9.7 MG/DL (ref 8.6–10.4)
CHLORIDE SERPL-SCNC: 106 MMOL/L (ref 98–107)
CHOLEST SERPL-MCNC: 183 MG/DL (ref 0–199)
CHOLESTEROL/HDL RATIO: 3.7
CO2 SERPL-SCNC: 27 MMOL/L (ref 20–31)
CREAT SERPL-MCNC: 1.1 MG/DL (ref 0.7–1.2)
GFR, ESTIMATED: 71 ML/MIN/1.73M2
GLOBULIN SER CALC-MCNC: 2.4 G/DL
GLUCOSE P FAST SERPL-MCNC: 110 MG/DL (ref 74–99)
HDLC SERPL-MCNC: 50 MG/DL
LDLC SERPL CALC-MCNC: 111 MG/DL (ref 0–100)
POTASSIUM SERPL-SCNC: 5.1 MMOL/L (ref 3.7–5.3)
PROT SERPL-MCNC: 6.8 G/DL (ref 6.6–8.7)
PSA SERPL-MCNC: 0.7 NG/ML (ref 0–4)
SODIUM SERPL-SCNC: 143 MMOL/L (ref 136–145)
TRIGL SERPL-MCNC: 112 MG/DL (ref 0–149)
VLDLC SERPL CALC-MCNC: 22 MG/DL (ref 1–30)

## 2025-05-08 PROCEDURE — 80061 LIPID PANEL: CPT

## 2025-05-08 PROCEDURE — 80048 BASIC METABOLIC PNL TOTAL CA: CPT

## 2025-05-08 PROCEDURE — 80076 HEPATIC FUNCTION PANEL: CPT

## 2025-05-08 PROCEDURE — G0103 PSA SCREENING: HCPCS

## 2025-05-08 PROCEDURE — 36415 COLL VENOUS BLD VENIPUNCTURE: CPT

## 2025-05-09 ENCOUNTER — RESULTS FOLLOW-UP (OUTPATIENT)
Dept: PRIMARY CARE CLINIC | Age: 70
End: 2025-05-09

## 2025-07-23 ENCOUNTER — TELEPHONE (OUTPATIENT)
Dept: PRIMARY CARE CLINIC | Age: 70
End: 2025-07-23

## 2025-07-23 ENCOUNTER — TRANSCRIBE ORDERS (OUTPATIENT)
Dept: ADMINISTRATIVE | Age: 70
End: 2025-07-23

## 2025-07-23 ENCOUNTER — HOSPITAL ENCOUNTER (OUTPATIENT)
Dept: VASCULAR LAB | Age: 70
Discharge: HOME | End: 2025-07-25
Payer: MEDICARE

## 2025-07-23 DIAGNOSIS — M79.89 LEG SWELLING: ICD-10-CM

## 2025-07-23 DIAGNOSIS — R79.89 ELEVATED D-DIMER: ICD-10-CM

## 2025-07-23 DIAGNOSIS — M79.669 CALF PAIN, UNSPECIFIED LATERALITY: Primary | ICD-10-CM

## 2025-07-23 PROCEDURE — 93971 EXTREMITY STUDY: CPT

## 2025-07-23 PROCEDURE — 93971 EXTREMITY STUDY: CPT | Performed by: SURGERY

## 2025-07-23 NOTE — TELEPHONE ENCOUNTER
Monique called in to report patient has a left leg DVT. Was given Eliquis 5mg with 74 tablets by [facility]. Called In asking what they should do with patient. Advised Dr. Sawant patients pcp has left for the day so I asked a different provider Dr. Pollard and she stated patient is ok to send home as he has an appointment with Dr. Sawant tomorrow 7/24/25 @9:45am and has Eliquis until the meantime.

## 2025-07-24 ENCOUNTER — OFFICE VISIT (OUTPATIENT)
Dept: PRIMARY CARE CLINIC | Age: 70
End: 2025-07-24

## 2025-07-24 VITALS
DIASTOLIC BLOOD PRESSURE: 78 MMHG | WEIGHT: 174 LBS | HEART RATE: 67 BPM | OXYGEN SATURATION: 98 % | HEIGHT: 68 IN | SYSTOLIC BLOOD PRESSURE: 134 MMHG | BODY MASS INDEX: 26.37 KG/M2

## 2025-07-24 DIAGNOSIS — I82.4Y2 ACUTE DEEP VEIN THROMBOSIS (DVT) OF PROXIMAL VEIN OF LEFT LOWER EXTREMITY (HCC): Primary | ICD-10-CM

## 2025-07-24 RX ORDER — MAGNESIUM GLYCINATE 100 MG
CAPSULE ORAL
COMMUNITY
Start: 2025-01-01

## 2025-07-24 ASSESSMENT — PATIENT HEALTH QUESTIONNAIRE - PHQ9
1. LITTLE INTEREST OR PLEASURE IN DOING THINGS: SEVERAL DAYS
2. FEELING DOWN, DEPRESSED OR HOPELESS: SEVERAL DAYS
SUM OF ALL RESPONSES TO PHQ QUESTIONS 1-9: 2

## 2025-07-24 NOTE — PROGRESS NOTES
[facility]  [facility]  [address]  [place] [address]  Dept: [phone]    Jensen Villalpando is a 70 y.o. male Established patient, who presents today for his medical conditions/complaints as noted below.      Chief Complaint   Patient presents with    Results     Patient here today for positive DVT        HPI:     History of Present Illness  The patient presents for leg pain.    Approximately 5 to 6 days ago, he began experiencing pain in his calf and behind his knee. Despite being an active individual who plays golf twice a week and tennis three times a week, he could not identify any specific incident that might have caused the pain. The discomfort was severe enough to disrupt his sleep. He attempted to alleviate the pain by walking, but when it persisted, he sought medical attention at [facility]. Blood tests suggested the presence of clots, which was confirmed by an ultrasound revealing a major clot in his upper calf and additional clots elsewhere. He reports no prolonged periods of immobility or bed rest, although he does take frequent naps. His recent activities include cycling 18 miles last weekend and taking a few long car trips for golf outings. He reports no chest heaviness, pressure sensations, or shortness of breath. He is aware of the potential risks associated with blood clots, including their possible migration to the abdomen, lungs, or heart. He is currently taking Tylenol as needed for pain relief and is considering whether to continue with upper body exercises during his recovery. He also questions whether crossing his legs could exacerbate his condition. He is currently on Eliquis 10 mg twice daily for 7 days, followed by 5 mg twice daily for 3 weeks. He recalls a hamstring injury about 10 weeks ago that kept him from playing tennis for two months and required physical therapy, but he does not believe this is related to his current

## 2025-07-25 ENCOUNTER — PATIENT MESSAGE (OUTPATIENT)
Dept: PRIMARY CARE CLINIC | Age: 70
End: 2025-07-25

## 2025-07-25 DIAGNOSIS — E78.5 HYPERLIPIDEMIA, UNSPECIFIED HYPERLIPIDEMIA TYPE: Chronic | ICD-10-CM

## 2025-07-25 DIAGNOSIS — N20.0 NEPHROLITHIASIS: Primary | Chronic | ICD-10-CM

## 2025-07-28 NOTE — TELEPHONE ENCOUNTER
Call placed to Chris Fox's office at [phone]    No appointments available until August 28th, therefor patient has soonest possible at this time